# Patient Record
Sex: FEMALE | Race: WHITE | Employment: OTHER | URBAN - METROPOLITAN AREA
[De-identification: names, ages, dates, MRNs, and addresses within clinical notes are randomized per-mention and may not be internally consistent; named-entity substitution may affect disease eponyms.]

---

## 2017-11-25 ENCOUNTER — HOSPITAL ENCOUNTER (OUTPATIENT)
Dept: ULTRASOUND IMAGING | Facility: HOSPITAL | Age: 67
Discharge: HOME | End: 2017-11-25
Attending: INTERNAL MEDICINE
Payer: MEDICAID

## 2017-11-25 DIAGNOSIS — R16.0 HEPATOMEGALY: ICD-10-CM

## 2017-11-25 DIAGNOSIS — R22.1 MASS OF LEFT SIDE OF NECK: ICD-10-CM

## 2017-11-25 PROCEDURE — 76700 US EXAM ABDOM COMPLETE: CPT | Performed by: INTERNAL MEDICINE

## 2017-11-25 PROCEDURE — 76536 US EXAM OF HEAD AND NECK: CPT | Performed by: INTERNAL MEDICINE

## 2018-02-12 ENCOUNTER — OFFICE VISIT (OUTPATIENT)
Dept: PULMONOLOGY | Facility: CLINIC | Age: 68
End: 2018-02-12

## 2018-02-12 VITALS
HEIGHT: 62 IN | DIASTOLIC BLOOD PRESSURE: 72 MMHG | HEART RATE: 116 BPM | RESPIRATION RATE: 20 BRPM | SYSTOLIC BLOOD PRESSURE: 124 MMHG | TEMPERATURE: 97 F | OXYGEN SATURATION: 90 % | WEIGHT: 95 LBS | BODY MASS INDEX: 17.48 KG/M2

## 2018-02-12 DIAGNOSIS — I25.9 CHEST PAIN DUE TO MYOCARDIAL ISCHEMIA, UNSPECIFIED ISCHEMIC CHEST PAIN TYPE: ICD-10-CM

## 2018-02-12 DIAGNOSIS — Z87.891 PERSONAL HISTORY OF TOBACCO USE, PRESENTING HAZARDS TO HEALTH: ICD-10-CM

## 2018-02-12 DIAGNOSIS — J42 CHRONIC BRONCHITIS, UNSPECIFIED CHRONIC BRONCHITIS TYPE (HCC): Primary | ICD-10-CM

## 2018-02-12 PROCEDURE — 94761 N-INVAS EAR/PLS OXIMETRY MLT: CPT | Performed by: INTERNAL MEDICINE

## 2018-02-12 PROCEDURE — 99212 OFFICE O/P EST SF 10 MIN: CPT | Performed by: INTERNAL MEDICINE

## 2018-02-12 PROCEDURE — 94010 BREATHING CAPACITY TEST: CPT | Performed by: INTERNAL MEDICINE

## 2018-02-12 PROCEDURE — 99244 OFF/OP CNSLTJ NEW/EST MOD 40: CPT | Performed by: INTERNAL MEDICINE

## 2018-02-12 NOTE — PATIENT INSTRUCTIONS
[facility] [phone] for oxygen order    Pulmonary Rehab [phone]    [facility] [phone] for prior authorization for LexiScan and CT Scan

## 2018-02-12 NOTE — PROGRESS NOTES
Dear Romario Anthony:           As you know, Geraldo Peterson is a 70-year-old female who I am now evaluating for COPD.     HISTORY OF PRESENT ILLNESS: The patient has a history of tobacco use having smoked approximately 1 pack per day through most of her adult life and she i Abdomen nontender, without hepatosplenomegaly and no mass appreciable. Extremities without clubbing cyanosis nor edema. Neurologic grossly intact with symmetric tone and strength and reflex. LABORATORY: None    ASSESSMENT AND PLAN:  PROBLEM 1.   Dyspnea

## 2018-03-08 ENCOUNTER — TELEPHONE (OUTPATIENT)
Dept: PULMONOLOGY | Facility: CLINIC | Age: 68
End: 2018-03-08

## 2018-03-08 DIAGNOSIS — J44.9 CHRONIC OBSTRUCTIVE PULMONARY DISEASE, UNSPECIFIED COPD TYPE (HCC): Primary | ICD-10-CM

## 2018-03-08 NOTE — TELEPHONE ENCOUNTER
Merritt Kiran requesting order for POC be sent to [facility] informed will send order today via fax.  Merritt Kiran instructed to have pt f/u tomorrow with Raphael Muñiz re: status of order as pt only has until 3/12 until she would need a new f2f before getting a new order due to 3

## 2018-04-16 ENCOUNTER — OFFICE VISIT (OUTPATIENT)
Dept: NEUROLOGY | Facility: CLINIC | Age: 68
End: 2018-04-16

## 2018-04-16 ENCOUNTER — HOSPITAL ENCOUNTER (OUTPATIENT)
Dept: GENERAL RADIOLOGY | Facility: HOSPITAL | Age: 68
Discharge: HOME | End: 2018-04-16
Attending: PHYSICAL MEDICINE & REHABILITATION
Payer: MEDICAID

## 2018-04-16 ENCOUNTER — TELEPHONE (OUTPATIENT)
Dept: NEUROLOGY | Facility: CLINIC | Age: 68
End: 2018-04-16

## 2018-04-16 VITALS
RESPIRATION RATE: 13 BRPM | HEART RATE: 85 BPM | HEIGHT: 62 IN | WEIGHT: 95 LBS | DIASTOLIC BLOOD PRESSURE: 66 MMHG | BODY MASS INDEX: 17.48 KG/M2 | SYSTOLIC BLOOD PRESSURE: 114 MMHG

## 2018-04-16 DIAGNOSIS — G89.29 CHRONIC BILATERAL LOW BACK PAIN WITHOUT SCIATICA: ICD-10-CM

## 2018-04-16 DIAGNOSIS — M54.12 CERVICAL RADICULOPATHY: ICD-10-CM

## 2018-04-16 DIAGNOSIS — M54.50 CHRONIC BILATERAL LOW BACK PAIN WITHOUT SCIATICA: ICD-10-CM

## 2018-04-16 DIAGNOSIS — M54.2 NECK PAIN: ICD-10-CM

## 2018-04-16 DIAGNOSIS — M54.16 LUMBAR RADICULOPATHY: ICD-10-CM

## 2018-04-16 DIAGNOSIS — M54.2 NECK PAIN: Primary | ICD-10-CM

## 2018-04-16 DIAGNOSIS — G56.01 RIGHT CARPAL TUNNEL SYNDROME: ICD-10-CM

## 2018-04-16 PROCEDURE — 99204 OFFICE O/P NEW MOD 45 MIN: CPT | Performed by: PHYSICAL MEDICINE & REHABILITATION

## 2018-04-16 PROCEDURE — 72050 X-RAY EXAM NECK SPINE 4/5VWS: CPT | Performed by: PHYSICAL MEDICINE & REHABILITATION

## 2018-04-16 PROCEDURE — 72120 X-RAY BEND ONLY L-S SPINE: CPT | Performed by: PHYSICAL MEDICINE & REHABILITATION

## 2018-04-16 NOTE — TELEPHONE ENCOUNTER
[facility] Online for authorization of approval for MRI L-spine wo. Approval was given with Authorization # [id number] effective 04/16/18 to 05/31/18. [facility]le [facility] Online for authorization of approval for MRI C-spine wo.  Case # [id number] p

## 2018-04-16 NOTE — TELEPHONE ENCOUNTER
Richelle for [facility] BS Online for authorization of approval for MRI C-spine wo. Case # [id number] pending approval. Will call Yunior Calderon MRI C-spine is approved

## 2018-04-16 NOTE — PROGRESS NOTES
Back Pain H & P    Chief Complaint:  Patient presents with:  Back Pain: new right handed patient here with chronic entire back pain that worsens with lifting, bending. denies numbness and tingling.  denies recent imaging, physical therapy or spinal injectio History     Social History  Social History   Marital status: Life Partner  Spouse name: N/A    Years of education: N/A  Number of children: N/A     Occupational History  None on file     Social History Main Topics   Smoking status: Current Every Day Smoker or discharge bilaterally. Skin:  There are no rashes or lesions.      Lymph nodes:  Non palpable submandibular, supraclavicular, and posterior cervical    Vitals:   04/16/18  1539   BP: 114/66   Pulse: 85   Resp: 13     Cervical Spine:    Posture: modera 3/5 with significant atrophy  FDI Left: 4/5  ADM Left: 4/5  EIP Left: 4/5  Triceps Left: 3+/5  Shoulder external rotators Right: 4-/5  Shoulder external rotators Left: 4-/5  Serratus anterior Right: 3+/5  Serratus anterior Left: 3+/5   Reflexes: 2+ In the C8 radiculopathies    3. Right carpal tunnel syndrome    4. Chronic bilateral low back pain without sciatica    5. left > right L5-S1 radiculopathies      Plan  She will try Aleve 2 times a day and will see if this will help her pain better.     She will ge

## 2018-04-16 NOTE — PATIENT INSTRUCTIONS
As of October 6th 2014, the Drug Enforcement Agency [facility]) is reclassifying all hydrocodone combination medications from Schedule III to Schedule II. This includes medications such as Norco, Vicodin, Lortab, Zohydro, and Vicoprofen.     What this means for y chart.      Plan  She will try Aleve 2 times a day and will see if this will help her pain better. She will get cervical and lumbar spine x-rays and MRI scans.     She will call me once she has had the imaging studies and I will review them and my office

## 2018-04-18 NOTE — TELEPHONE ENCOUNTER
Richelle for [facility] BS Online to check on status for authorization of approval for MRI C-spine wo. Approval was given with Authorization # [id number] effective 04/18/18 to 06/01/18. Will call Pt. to inform. Pt. Informed of approvals.  MRIs are scheduled on 05/0

## 2018-05-07 ENCOUNTER — HOSPITAL ENCOUNTER (OUTPATIENT)
Dept: MRI IMAGING | Facility: HOSPITAL | Age: 68
Discharge: HOME | End: 2018-05-07
Attending: PHYSICAL MEDICINE & REHABILITATION
Payer: MEDICAID

## 2018-05-07 DIAGNOSIS — M54.50 CHRONIC BILATERAL LOW BACK PAIN WITHOUT SCIATICA: ICD-10-CM

## 2018-05-07 DIAGNOSIS — M54.12 CERVICAL RADICULOPATHY: ICD-10-CM

## 2018-05-07 DIAGNOSIS — M54.16 LUMBAR RADICULOPATHY: ICD-10-CM

## 2018-05-07 DIAGNOSIS — G89.29 CHRONIC BILATERAL LOW BACK PAIN WITHOUT SCIATICA: ICD-10-CM

## 2018-05-07 DIAGNOSIS — M54.2 NECK PAIN: ICD-10-CM

## 2018-05-07 PROCEDURE — 72148 MRI LUMBAR SPINE W/O DYE: CPT | Performed by: PHYSICAL MEDICINE & REHABILITATION

## 2018-05-07 PROCEDURE — 72141 MRI NECK SPINE W/O DYE: CPT | Performed by: PHYSICAL MEDICINE & REHABILITATION

## 2018-05-14 ENCOUNTER — OFFICE VISIT (OUTPATIENT)
Dept: PULMONOLOGY | Facility: CLINIC | Age: 68
End: 2018-05-14

## 2018-05-14 VITALS
BODY MASS INDEX: 17.74 KG/M2 | DIASTOLIC BLOOD PRESSURE: 77 MMHG | HEIGHT: 62 IN | SYSTOLIC BLOOD PRESSURE: 111 MMHG | OXYGEN SATURATION: 91 % | RESPIRATION RATE: 19 BRPM | HEART RATE: 75 BPM | WEIGHT: 96.38 LBS

## 2018-05-14 DIAGNOSIS — J42 CHRONIC BRONCHITIS, UNSPECIFIED CHRONIC BRONCHITIS TYPE (HCC): Primary | ICD-10-CM

## 2018-05-14 PROCEDURE — 99213 OFFICE O/P EST LOW 20 MIN: CPT | Performed by: INTERNAL MEDICINE

## 2018-05-14 PROCEDURE — 99212 OFFICE O/P EST SF 10 MIN: CPT | Performed by: INTERNAL MEDICINE

## 2018-05-14 RX ORDER — ALBUTEROL SULFATE 2.5 MG/3ML
2.5 SOLUTION RESPIRATORY (INHALATION) EVERY 6 HOURS PRN
Qty: 120 VIAL | Refills: 4 | Status: SHIPPED | OUTPATIENT
Start: 2018-05-14 | End: 2019-12-10

## 2018-05-14 NOTE — PROGRESS NOTES
The patient is a 42-year-old female who comes in now for follow-up. She has COPD and she continues to smoke. She was advised to get a stress test which she did not follow-up on and she defers for now.   I also encouraged her to matriculate pulmonary rehab

## 2018-06-08 ENCOUNTER — TELEPHONE (OUTPATIENT)
Dept: PULMONOLOGY | Facility: CLINIC | Age: 68
End: 2018-06-08

## 2018-06-08 NOTE — TELEPHONE ENCOUNTER
Ya/CAITY called to find out if pt has had oxygen saturation test done within last year. Please call.

## 2018-06-28 ENCOUNTER — TELEPHONE (OUTPATIENT)
Dept: NEUROLOGY | Facility: CLINIC | Age: 68
End: 2018-06-28

## 2018-06-28 PROBLEM — M48.061 LUMBAR FORAMINAL STENOSIS: Status: ACTIVE | Noted: 2018-06-28

## 2018-06-28 PROBLEM — M51.9 LUMBAR DISC DISEASE: Status: ACTIVE | Noted: 2018-06-28

## 2018-06-28 PROBLEM — M50.90 CERVICAL DISC DISEASE: Status: ACTIVE | Noted: 2018-06-28

## 2018-06-28 PROBLEM — M48.02 CERVICAL STENOSIS OF SPINE: Status: ACTIVE | Noted: 2018-06-28

## 2018-06-28 PROBLEM — M47.892 OTHER SPONDYLOSIS, CERVICAL REGION: Status: ACTIVE | Noted: 2018-06-28

## 2018-06-28 NOTE — TELEPHONE ENCOUNTER
----- Message from Kathy Oconnell MD sent at 6/28/2018  4:48 PM CDT -----  L5-S1 > L4-5 bulging discs. I can review with her at her follow up, her options.   We had spoken about her starting PT after she had called me to let me know that she had had the im

## 2018-07-30 ENCOUNTER — OFFICE VISIT (OUTPATIENT)
Dept: NEUROLOGY | Facility: CLINIC | Age: 68
End: 2018-07-30
Payer: MEDICAID

## 2018-07-30 VITALS — DIASTOLIC BLOOD PRESSURE: 54 MMHG | HEART RATE: 98 BPM | SYSTOLIC BLOOD PRESSURE: 90 MMHG | RESPIRATION RATE: 24 BRPM

## 2018-07-30 DIAGNOSIS — M48.061 LUMBAR FORAMINAL STENOSIS: ICD-10-CM

## 2018-07-30 DIAGNOSIS — M54.50 CHRONIC BILATERAL LOW BACK PAIN WITHOUT SCIATICA: ICD-10-CM

## 2018-07-30 DIAGNOSIS — M54.12 CERVICAL RADICULOPATHY: Primary | ICD-10-CM

## 2018-07-30 DIAGNOSIS — M54.16 LUMBAR RADICULOPATHY: ICD-10-CM

## 2018-07-30 DIAGNOSIS — M48.02 CERVICAL STENOSIS OF SPINE: ICD-10-CM

## 2018-07-30 DIAGNOSIS — M51.9 LUMBAR DISC DISEASE: ICD-10-CM

## 2018-07-30 DIAGNOSIS — G89.29 CHRONIC BILATERAL LOW BACK PAIN WITHOUT SCIATICA: ICD-10-CM

## 2018-07-30 DIAGNOSIS — M25.511 ACUTE PAIN OF RIGHT SHOULDER: ICD-10-CM

## 2018-07-30 DIAGNOSIS — M50.90 CERVICAL DISC DISEASE: ICD-10-CM

## 2018-07-30 DIAGNOSIS — M54.2 NECK PAIN: ICD-10-CM

## 2018-07-30 PROCEDURE — 99214 OFFICE O/P EST MOD 30 MIN: CPT | Performed by: PHYSICAL MEDICINE & REHABILITATION

## 2018-07-30 RX ORDER — DULOXETIN HYDROCHLORIDE 30 MG/1
30 CAPSULE, DELAYED RELEASE ORAL DAILY
Qty: 30 CAPSULE | Refills: 2 | Status: SHIPPED | OUTPATIENT
Start: 2018-07-30 | End: 2018-11-01

## 2018-07-30 NOTE — PROGRESS NOTES
Low Back Pain H & P    Chief Complaint: Patient presents with:  Neck Pain: pt here for follow up after Lumbar Spine and Cervical Spine MRI 5/2018 with c/o continued entire spine pain (neck pain and back pain) that is constant with a pain score of 10/10.  pt - Lumbar  · The pain is located in the bilateral low back. · The pain does not radiate. .  · There is no numbness. · There is no tingling in the legs. · There is weakness in both legs. She noticed this a couple of days ago.    · The low back pain is a and reactive to light. No redness or discharge bilaterally. Skin:  There are no rashes or lesions. Lymph Nodes: The patient has no palpable submandibular, supraclavicular, and cervical lymph nodes. .    Vitals:   07/30/18  1601   BP: 90/54   Pulse: 9 T12, L1, L2, L3, L4, L5 and S1     Vascular lower extremity:   Dorsalis pedis pulse-RIGHT 2+   Dorsalis pedis pulse-LEFT 2+   Tibialis posterior pulse-RIGHT 2+   Tibialis posterior pulse-LEFT 2+     Neurological Lower Extremity:    Light Touch Sensation: I

## 2018-07-30 NOTE — PATIENT INSTRUCTIONS
As of October 6th 2014, the Drug Enforcement Agency [facility]) is reclassifying all hydrocodone combination medications from Schedule III to Schedule II. This includes medications such as Norco, Vicodin, Lortab, Zohydro, and Vicoprofen.     What this means for y will try Cymbalta for the pain. She does not want to do any PT at this time due to her 's illness. The patient does not need any injections at this time. She will call me in one month to let me know how she is doing with the Cymbalta.

## 2018-08-27 ENCOUNTER — TELEPHONE (OUTPATIENT)
Dept: NEUROLOGY | Facility: CLINIC | Age: 68
End: 2018-08-27

## 2018-08-27 NOTE — TELEPHONE ENCOUNTER
Pt calling with sister,Yenny Ojeda, on the line. Pt's  passed away this week and Pt is now eligible for social security benefits but unable to get to [facility] office due to her condition. Pt requesting a letter from Dr. Mady Mulligan stating her condition.     P

## 2018-08-27 NOTE — TELEPHONE ENCOUNTER
Spoke with Sandy Eisenberg and we would be able to write a note with the patient's dx but not a note stating she is unable to go to [employer] based on her LOV. Called sister Margo Morin, offered condolences from the office, and informed her of the above.

## 2018-08-28 ENCOUNTER — TELEPHONE (OUTPATIENT)
Dept: NEUROLOGY | Facility: CLINIC | Age: 68
End: 2018-08-28

## 2018-08-28 NOTE — TELEPHONE ENCOUNTER
Patient has 2 refills on Duloxetine 30mg   Duloxetine 30mg daily #30 r-2 was sent to [facility] in [facility]

## 2018-11-01 ENCOUNTER — OFFICE VISIT (OUTPATIENT)
Dept: NEUROLOGY | Facility: CLINIC | Age: 68
End: 2018-11-01
Payer: MEDICAID

## 2018-11-01 ENCOUNTER — TELEPHONE (OUTPATIENT)
Dept: NEUROLOGY | Facility: CLINIC | Age: 68
End: 2018-11-01

## 2018-11-01 VITALS — HEART RATE: 70 BPM | DIASTOLIC BLOOD PRESSURE: 62 MMHG | RESPIRATION RATE: 20 BRPM | SYSTOLIC BLOOD PRESSURE: 118 MMHG

## 2018-11-01 DIAGNOSIS — M48.061 LUMBAR FORAMINAL STENOSIS: ICD-10-CM

## 2018-11-01 DIAGNOSIS — R26.9 NEUROLOGIC GAIT DISORDER: ICD-10-CM

## 2018-11-01 DIAGNOSIS — M50.90 CERVICAL DISC DISEASE: ICD-10-CM

## 2018-11-01 DIAGNOSIS — J42 CHRONIC BRONCHITIS, UNSPECIFIED CHRONIC BRONCHITIS TYPE (HCC): ICD-10-CM

## 2018-11-01 DIAGNOSIS — G89.29 CHRONIC BILATERAL LOW BACK PAIN WITHOUT SCIATICA: ICD-10-CM

## 2018-11-01 DIAGNOSIS — M54.16 LUMBAR RADICULOPATHY: Primary | ICD-10-CM

## 2018-11-01 DIAGNOSIS — M54.50 CHRONIC BILATERAL LOW BACK PAIN WITHOUT SCIATICA: ICD-10-CM

## 2018-11-01 DIAGNOSIS — M51.9 LUMBAR DISC DISEASE: ICD-10-CM

## 2018-11-01 DIAGNOSIS — M54.2 NECK PAIN: ICD-10-CM

## 2018-11-01 DIAGNOSIS — M48.02 CERVICAL STENOSIS OF SPINE: ICD-10-CM

## 2018-11-01 PROCEDURE — 99214 OFFICE O/P EST MOD 30 MIN: CPT | Performed by: PHYSICAL MEDICINE & REHABILITATION

## 2018-11-01 RX ORDER — DULOXETIN HYDROCHLORIDE 30 MG/1
30 CAPSULE, DELAYED RELEASE ORAL 2 TIMES DAILY
Qty: 60 CAPSULE | Refills: 0 | Status: SHIPPED | OUTPATIENT
Start: 2018-11-01 | End: 2018-11-13

## 2018-11-01 NOTE — PROGRESS NOTES
Low Back Pain H & P    Chief Complaint: Patient presents with:  Low Back Pain: pt here for follow up with c/o severe low back pain that travels up the back to the neck. feels the pain worsened since last visit. pain improves with sitting still.  discontinue Transportation needs - medical: Not on file      Transportation needs - non-medical: Not on file    Occupational History      Not on file    Tobacco Use      Smoking status: Current Every Day Smoker        Packs/day: 1.00        Years: 30.00        Pack ye Extremity:    Light Touch: Intact in Bilateral upper extremities. Pin Prick: Not tested. UE Muscle Strength:  All Upper Extremity strength measurements 5/5 except:  ABP Right: 3+/5  ABP Left: 3+/5  FDI Right: 3+/5  FDI Left: 3+/5  ADM Right: 3+/5  ADM L agrees with the stated plan. Cristopher Caba MD  11/1/2018

## 2018-11-01 NOTE — PATIENT INSTRUCTIONS
As of October 6th 2014, the Drug Enforcement Agency [facility]) is reclassifying all hydrocodone combination medications from Schedule III to Schedule II. This includes medications such as Norco, Vicodin, Lortab, Zohydro, and Vicoprofen.     What this means for y will perform bilateral L5 TFESI(s) under MAC. She will get home health PT and OT. The patient will follow up in 3 months, but the patient will call me 2 weeks after having the injection to let me know how the injection worked.     She will try the Cym

## 2018-11-01 NOTE — TELEPHONE ENCOUNTER
Patient has been scheduled for a bilateral L5 TFESI on 11/16/18 at the [address] [place] [address]. Medications and allergies reviewed. Patient informed to hold aspirins, nsaids, blood thinners, vitamins and fish oils 3-7 days prior to procedure.  Patient informed we will need roxana

## 2018-11-01 NOTE — TELEPHONE ENCOUNTER
Bilateral L5 TFESIs cpt codes [id number], 01139  Pt. is eligible for services until 11/30/18. On 12/01/18 Pt. will be eligible with [employer]. We are out of network with this plan. Will inform Nursing.

## 2018-11-16 ENCOUNTER — HOSPITAL ENCOUNTER (OUTPATIENT)
Facility: HOSPITAL | Age: 68
Setting detail: HOSPITAL OUTPATIENT SURGERY
Discharge: HOME | End: 2018-11-16
Attending: PHYSICAL MEDICINE & REHABILITATION | Admitting: PHYSICAL MEDICINE & REHABILITATION
Payer: MEDICAID

## 2018-11-16 ENCOUNTER — ANESTHESIA (OUTPATIENT)
Dept: SURGERY | Facility: HOSPITAL | Age: 68
End: 2018-11-16
Payer: MEDICAID

## 2018-11-16 ENCOUNTER — ANESTHESIA EVENT (OUTPATIENT)
Dept: SURGERY | Facility: HOSPITAL | Age: 68
End: 2018-11-16
Payer: MEDICAID

## 2018-11-16 ENCOUNTER — APPOINTMENT (OUTPATIENT)
Dept: GENERAL RADIOLOGY | Facility: HOSPITAL | Age: 68
End: 2018-11-16
Attending: PHYSICAL MEDICINE & REHABILITATION
Payer: MEDICAID

## 2018-11-16 VITALS
TEMPERATURE: 98 F | WEIGHT: 90 LBS | BODY MASS INDEX: 16.56 KG/M2 | OXYGEN SATURATION: 100 % | HEART RATE: 67 BPM | SYSTOLIC BLOOD PRESSURE: 141 MMHG | HEIGHT: 62 IN | RESPIRATION RATE: 16 BRPM | DIASTOLIC BLOOD PRESSURE: 55 MMHG

## 2018-11-16 PROCEDURE — 3E0R3BZ INTRODUCTION OF ANESTHETIC AGENT INTO SPINAL CANAL, PERCUTANEOUS APPROACH: ICD-10-PCS | Performed by: PHYSICAL MEDICINE & REHABILITATION

## 2018-11-16 PROCEDURE — 3E0R33Z INTRODUCTION OF ANTI-INFLAMMATORY INTO SPINAL CANAL, PERCUTANEOUS APPROACH: ICD-10-PCS | Performed by: PHYSICAL MEDICINE & REHABILITATION

## 2018-11-16 PROCEDURE — 64483 NJX AA&/STRD TFRM EPI L/S 1: CPT | Performed by: PHYSICAL MEDICINE & REHABILITATION

## 2018-11-16 RX ORDER — LIDOCAINE HYDROCHLORIDE 10 MG/ML
INJECTION, SOLUTION EPIDURAL; INFILTRATION; INTRACAUDAL; PERINEURAL AS NEEDED
Status: DISCONTINUED | OUTPATIENT
Start: 2018-11-16 | End: 2018-11-16 | Stop reason: HOSPADM

## 2018-11-16 RX ORDER — HYDROCODONE BITARTRATE AND ACETAMINOPHEN 5; 325 MG/1; MG/1
1 TABLET ORAL AS NEEDED
Status: DISCONTINUED | OUTPATIENT
Start: 2018-11-16 | End: 2018-11-16

## 2018-11-16 RX ORDER — HALOPERIDOL 5 MG/ML
0.25 INJECTION INTRAMUSCULAR ONCE AS NEEDED
Status: DISCONTINUED | OUTPATIENT
Start: 2018-11-16 | End: 2018-11-16

## 2018-11-16 RX ORDER — SODIUM CHLORIDE, SODIUM LACTATE, POTASSIUM CHLORIDE, CALCIUM CHLORIDE 600; 310; 30; 20 MG/100ML; MG/100ML; MG/100ML; MG/100ML
INJECTION, SOLUTION INTRAVENOUS CONTINUOUS
Status: DISCONTINUED | OUTPATIENT
Start: 2018-11-16 | End: 2018-11-16

## 2018-11-16 RX ORDER — FAMOTIDINE 20 MG/1
20 TABLET ORAL ONCE
Status: DISCONTINUED | OUTPATIENT
Start: 2018-11-16 | End: 2018-11-16 | Stop reason: HOSPADM

## 2018-11-16 RX ORDER — HYDROCODONE BITARTRATE AND ACETAMINOPHEN 5; 325 MG/1; MG/1
2 TABLET ORAL AS NEEDED
Status: DISCONTINUED | OUTPATIENT
Start: 2018-11-16 | End: 2018-11-16

## 2018-11-16 RX ORDER — METOPROLOL TARTRATE 5 MG/5ML
2.5 INJECTION INTRAVENOUS ONCE
Status: DISCONTINUED | OUTPATIENT
Start: 2018-11-16 | End: 2018-11-16

## 2018-11-16 RX ORDER — LIDOCAINE HYDROCHLORIDE 10 MG/ML
INJECTION, SOLUTION EPIDURAL; INFILTRATION; INTRACAUDAL; PERINEURAL AS NEEDED
Status: DISCONTINUED | OUTPATIENT
Start: 2018-11-16 | End: 2018-11-16 | Stop reason: SURG

## 2018-11-16 RX ORDER — MORPHINE SULFATE 10 MG/ML
6 INJECTION, SOLUTION INTRAMUSCULAR; INTRAVENOUS EVERY 10 MIN PRN
Status: DISCONTINUED | OUTPATIENT
Start: 2018-11-16 | End: 2018-11-16

## 2018-11-16 RX ORDER — ACETAMINOPHEN 500 MG
1000 TABLET ORAL ONCE
Status: DISCONTINUED | OUTPATIENT
Start: 2018-11-16 | End: 2018-11-16 | Stop reason: HOSPADM

## 2018-11-16 RX ORDER — NALOXONE HYDROCHLORIDE 0.4 MG/ML
80 INJECTION, SOLUTION INTRAMUSCULAR; INTRAVENOUS; SUBCUTANEOUS AS NEEDED
Status: DISCONTINUED | OUTPATIENT
Start: 2018-11-16 | End: 2018-11-16

## 2018-11-16 RX ORDER — ONDANSETRON 2 MG/ML
4 INJECTION INTRAMUSCULAR; INTRAVENOUS ONCE AS NEEDED
Status: DISCONTINUED | OUTPATIENT
Start: 2018-11-16 | End: 2018-11-16

## 2018-11-16 RX ORDER — MORPHINE SULFATE 4 MG/ML
2 INJECTION, SOLUTION INTRAMUSCULAR; INTRAVENOUS EVERY 10 MIN PRN
Status: DISCONTINUED | OUTPATIENT
Start: 2018-11-16 | End: 2018-11-16

## 2018-11-16 RX ORDER — TRIAMCINOLONE ACETONIDE 40 MG/ML
INJECTION, SUSPENSION INTRA-ARTICULAR; INTRAMUSCULAR AS NEEDED
Status: DISCONTINUED | OUTPATIENT
Start: 2018-11-16 | End: 2018-11-16 | Stop reason: HOSPADM

## 2018-11-16 RX ORDER — METOCLOPRAMIDE 10 MG/1
10 TABLET ORAL ONCE
Status: DISCONTINUED | OUTPATIENT
Start: 2018-11-16 | End: 2018-11-16 | Stop reason: HOSPADM

## 2018-11-16 RX ORDER — MORPHINE SULFATE 4 MG/ML
4 INJECTION, SOLUTION INTRAMUSCULAR; INTRAVENOUS EVERY 10 MIN PRN
Status: DISCONTINUED | OUTPATIENT
Start: 2018-11-16 | End: 2018-11-16

## 2018-11-16 RX ADMIN — LIDOCAINE HYDROCHLORIDE 50 MG: 10 INJECTION, SOLUTION EPIDURAL; INFILTRATION; INTRACAUDAL; PERINEURAL at 14:40:00

## 2018-11-16 NOTE — DISCHARGE SUMMARY
[facility]    Discharge Summary    Traci Cooper Patient Status:  Hospital Outpatient Surgery    1950 MRN [id number]   Location [address] [facility] Attending Riana Wang MD   Hosp Day # 0 PCP Õie 16 TAKE 1 TABLET BY MOUTH TWICE DAILY AS NEEDED FOR ANXIETY   Quantity:  60 tablet  Refills:  0     ASPIRIN LOW DOSE 81 MG Tbec  Generic drug:  aspirin      TAKE 1 TABLET BY MOUTH EVERY DAY   Quantity:  90 tablet  Refills:  0     Budesonide-Formoterol Fumarat

## 2018-11-16 NOTE — ANESTHESIA PREPROCEDURE EVALUATION
Anesthesia PreOp Note    HPI:     Honorio Roberts is a 76year old female who presents for preoperative consultation requested by: Rosalinda Cordoba MD    Date of Surgery: 11/16/2018    Procedure(s):  LUMBAR INTERLAMINAR EPIDURAL INJECTION  Indication: ri R hand tedon release         Medications Prior to Admission:  ASPIRIN LOW DOSE 81 MG Oral Tab EC TAKE 1 TABLET BY MOUTH EVERY DAY Disp: 90 tablet Rfl: 0 11/09/18   METOPROLOL TARTRATE 25 MG Oral Tab TAKE 1 TABLET(25 MG) BY MOUTH TWICE DAILY Disp: 180 ta file.      Penicillins             OTHER (SEE COMMENTS)    Comment:Was told as a child she's allergic, unsure what             type of reaction she has    Family History   Problem Relation Age of Onset   • Cancer Father 79        colon   • Heart Disorder M weight is 40.8 kg (90 lb). Her oral temperature is 97.4 °F (36.3 °C). Her blood pressure is 124/56 and her pulse is 71. Her respiration is 16 and oxygen saturation is 100%.     11/13/18  1120 11/16/18  1220   BP:  124/56   BP Location:  Right arm   Pulse:

## 2018-11-16 NOTE — OPERATIVE REPORT
[facility] AND [facility] Surgery    BILATERAL LUMBAR TRANSFORAMINAL   NAME:  Carlo Perez    MR #:    [id number] :  1950     PHYSICIAN:  Jaimie Diamond        Operative Report    DATE OF PROCEDURE: 2018   PREOPERATIVE DIAGNOSES: Problem   L4-5 intervertebral foramen. At this point in time, the patient's skin was anesthetized with 2 to 3 cc of 1% PF lidocaine without epinephrine. Then, a 3.5 inch, 22-gauge spinal needle was inserted and directed towards the left L5-S1 intervertebral foramen.   Molly Jonas

## 2018-11-16 NOTE — ANESTHESIA POSTPROCEDURE EVALUATION
Patient: Jeremias Gudino    Procedure Summary     Date:  11/16/18 Room / Location:  [address] [place] [address] MAIN OR 02 / [address] [place] [address] MAIN OR    Anesthesia Start:  5714 Anesthesia Stop:      Procedure:  LUMBAR INTERLAMINAR EPIDURAL INJECTION (Bilateral Back) Diagnosis:  (right L5-S

## 2018-12-05 ENCOUNTER — TELEPHONE (OUTPATIENT)
Dept: PULMONOLOGY | Facility: CLINIC | Age: 68
End: 2018-12-05

## 2018-12-05 RX ORDER — PREDNISONE 20 MG/1
TABLET ORAL
Qty: 10 TABLET | Refills: 0 | Status: SHIPPED | OUTPATIENT
Start: 2018-12-05 | End: 2018-12-15 | Stop reason: ALTCHOICE

## 2018-12-05 NOTE — TELEPHONE ENCOUNTER
Informed pt of Dr. Zo Kent orders. Sched pt on 12/6 11 am [facility]. Appt info given. Explained rx sent to pharmacy. She voiced understanding.

## 2018-12-05 NOTE — TELEPHONE ENCOUNTER
Yes pred and add on. I can have add on preload this Thurs, but not next Thursday as I chair a meeting then.

## 2018-12-05 NOTE — TELEPHONE ENCOUNTER
Pt states her breathing is worse and she would like to be seen sooner than first available. Please call.

## 2018-12-05 NOTE — TELEPHONE ENCOUNTER
Pt c/o HOLGUIN onset quite awhile which is progressively worsening. She denies dyspnea @ rest, wheezing, cough, or any other symptoms. Pt stts she is using Symbicort twice daily & albuterol sulfate HFA prn.  She stts she just used alb sulfate nebs which she has

## 2018-12-06 ENCOUNTER — OFFICE VISIT (OUTPATIENT)
Dept: PULMONOLOGY | Facility: CLINIC | Age: 68
End: 2018-12-06
Payer: MEDICAID

## 2018-12-06 ENCOUNTER — TELEPHONE (OUTPATIENT)
Dept: PULMONOLOGY | Facility: CLINIC | Age: 68
End: 2018-12-06

## 2018-12-06 VITALS
OXYGEN SATURATION: 100 % | WEIGHT: 96 LBS | HEIGHT: 62 IN | RESPIRATION RATE: 18 BRPM | DIASTOLIC BLOOD PRESSURE: 50 MMHG | BODY MASS INDEX: 17.66 KG/M2 | HEART RATE: 74 BPM | SYSTOLIC BLOOD PRESSURE: 86 MMHG

## 2018-12-06 DIAGNOSIS — J42 CHRONIC BRONCHITIS, UNSPECIFIED CHRONIC BRONCHITIS TYPE (HCC): Primary | ICD-10-CM

## 2018-12-06 PROCEDURE — 99213 OFFICE O/P EST LOW 20 MIN: CPT | Performed by: INTERNAL MEDICINE

## 2018-12-06 PROCEDURE — 99212 OFFICE O/P EST SF 10 MIN: CPT | Performed by: INTERNAL MEDICINE

## 2018-12-06 PROCEDURE — 94761 N-INVAS EAR/PLS OXIMETRY MLT: CPT | Performed by: INTERNAL MEDICINE

## 2018-12-06 NOTE — TELEPHONE ENCOUNTER
Pt seen in clinic today d/t sx per Haylee Salmeron (supervisor), but her insurance has recently changed to [employer] which is not in network. She stts she just rcvd insurance card in the mail. Per pt insurance is [employer], [employer] #[phone], ID #[id number], no Group #, & effective date 12/1/18. Explained to pt that RN trying to obtain prior auth for visit since her insurance is out of network. Pt voiced understanding. Per Reji Bergeron no prior Paulie Brooks is necessary for any codes billed [phone], [id number], & [phone]) if in network, we may want to join network, prior auth can only be done prior to visit, insurance must be billed first, we may request post service appeal once rejected which is likely since we are not in network, & there is nothing else we can do @ this point. She stts MR(s) & clinicals may be sent for review & determination would be made based on this info.

## 2018-12-06 NOTE — PROGRESS NOTES
The patient is a 51-year-old female who I know well from prior evaluation who comes in now for follow-up.   The patient has previously deferred low-dose CT screening program.  I also have encouraged her to matriculate pulmonary rehabilitation which she stil

## 2018-12-15 ENCOUNTER — APPOINTMENT (OUTPATIENT)
Dept: GENERAL RADIOLOGY | Facility: HOSPITAL | Age: 68
DRG: 329 | End: 2018-12-15
Attending: EMERGENCY MEDICINE
Payer: MEDICAID

## 2018-12-15 ENCOUNTER — HOSPITAL ENCOUNTER (INPATIENT)
Facility: HOSPITAL | Age: 68
LOS: 7 days | Discharge: HOME | DRG: 329 | End: 2018-12-22
Attending: EMERGENCY MEDICINE | Admitting: HOSPITALIST
Payer: MEDICAID

## 2018-12-15 DIAGNOSIS — K92.2 GASTROINTESTINAL HEMORRHAGE, UNSPECIFIED GASTROINTESTINAL HEMORRHAGE TYPE: ICD-10-CM

## 2018-12-15 DIAGNOSIS — D64.9 ANEMIA, UNSPECIFIED TYPE: Primary | ICD-10-CM

## 2018-12-15 PROCEDURE — 99254 IP/OBS CNSLTJ NEW/EST MOD 60: CPT | Performed by: INTERNAL MEDICINE

## 2018-12-15 PROCEDURE — 30233N1 TRANSFUSION OF NONAUTOLOGOUS RED BLOOD CELLS INTO PERIPHERAL VEIN, PERCUTANEOUS APPROACH: ICD-10-PCS | Performed by: HOSPITALIST

## 2018-12-15 PROCEDURE — 71046 X-RAY EXAM CHEST 2 VIEWS: CPT | Performed by: EMERGENCY MEDICINE

## 2018-12-15 PROCEDURE — 3E0F7GC INTRODUCTION OF OTHER THERAPEUTIC SUBSTANCE INTO RESPIRATORY TRACT, VIA NATURAL OR ARTIFICIAL OPENING: ICD-10-PCS | Performed by: HOSPITALIST

## 2018-12-15 PROCEDURE — 99223 1ST HOSP IP/OBS HIGH 75: CPT | Performed by: HOSPITALIST

## 2018-12-15 RX ORDER — FUROSEMIDE 10 MG/ML
20 INJECTION INTRAMUSCULAR; INTRAVENOUS
Status: DISCONTINUED | OUTPATIENT
Start: 2018-12-15 | End: 2018-12-18

## 2018-12-15 RX ORDER — IPRATROPIUM BROMIDE AND ALBUTEROL SULFATE 2.5; .5 MG/3ML; MG/3ML
3 SOLUTION RESPIRATORY (INHALATION) EVERY 6 HOURS PRN
Status: DISCONTINUED | OUTPATIENT
Start: 2018-12-15 | End: 2018-12-22

## 2018-12-15 RX ORDER — ALPRAZOLAM 1 MG/1
1 TABLET ORAL 2 TIMES DAILY PRN
Status: DISCONTINUED | OUTPATIENT
Start: 2018-12-15 | End: 2018-12-18

## 2018-12-15 RX ORDER — ACETAMINOPHEN 325 MG/1
650 TABLET ORAL EVERY 6 HOURS PRN
Status: DISCONTINUED | OUTPATIENT
Start: 2018-12-15 | End: 2018-12-22

## 2018-12-15 RX ORDER — FUROSEMIDE 10 MG/ML
40 INJECTION INTRAMUSCULAR; INTRAVENOUS ONCE
Status: COMPLETED | OUTPATIENT
Start: 2018-12-15 | End: 2018-12-15

## 2018-12-15 RX ORDER — ONDANSETRON 2 MG/ML
4 INJECTION INTRAMUSCULAR; INTRAVENOUS EVERY 6 HOURS PRN
Status: DISCONTINUED | OUTPATIENT
Start: 2018-12-15 | End: 2018-12-22

## 2018-12-15 RX ORDER — IPRATROPIUM BROMIDE AND ALBUTEROL SULFATE 2.5; .5 MG/3ML; MG/3ML
3 SOLUTION RESPIRATORY (INHALATION)
Status: DISCONTINUED | OUTPATIENT
Start: 2018-12-15 | End: 2018-12-22

## 2018-12-15 NOTE — CONSULTS
[facility] HOSP - [facility]    Report of Consultation    Jeremias Gudino Patient Status:  Emergency    1950 MRN [id number]   Location [address] Attending Ry Casiano MD   Hosp Day # 0 PCP Clovis Mejia     Date of assistive device. .      The patient does live in a home with stairs.             Current Medications:    Current Facility-Administered Medications:  furosemide (LASIX) injection 40 mg 40 mg Intravenous Once       (Not in a hospital admission)    Allergies interstitium. 2. No acute pulmonary consolidation     Dictated by (CST): Desmond Sicard, MD on 12/15/2018 at 12:47     Approved by (CST): Desmond Sicard, MD on 12/15/2018 at 12:49            Assessment   1. Severe anemia  2. COPD  3.   Dyspnea wit

## 2018-12-15 NOTE — CONSULTS
GI CONSULTATION:  Available medical records reviewed. Patient interviewed and examined. Please see orders and transcription. ( Dictated # [id number]).   Plan for blood transfusion to hemoglobin greater than 8 gradually over the next 1-2 days, preparation for c

## 2018-12-15 NOTE — CONSULTS
[facility]    [facility]   ATTENDING PHYSICIAN: Gretta De Anda MD   CONSULTING PHYSICIAN: Shantanu Barrera MD   PATIENT ACCOUNT#:   [de-identified]    LOCATION:  [facility] [address] [facility] 1  MEDICAL RECORD #:   [id number] lung cancer. REVIEW OF SYSTEMS:  Positive for fatigue, shortness of breath, wheezes. No chest pain. Positive for lower extremity edema as above. All other review of systems are negative on 12 points except as stated above.       PHYSICAL EXAMINATION: possibility. 2.   Chronic obstructive pulmonary disease as per pulmonary consult. 3.   Family history of colon cancer as above. RECOMMENDATIONS:    1. We will start a clear liquid diet tomorrow and prep for colonoscopy and EGD on Monday.   2.   Szymanski Patient

## 2018-12-15 NOTE — ED PROVIDER NOTES
Patient Seen in: [facility] AND [facility] Emergency Department    History   Patient presents with:  Dyspnea ANNE SOB (respiratory)    Stated Complaint: SOB for the past couple of days- states pale on Thursday.  Denies cp    HPI    27-year-old female with history and negative except as noted above.     Physical Exam     ED Triage Vitals   BP 12/15/18 1152 109/40   Pulse 12/15/18 1200 76   Resp 12/15/18 1152 24   Temp 12/15/18 1152 98.6 °F (37 °C)   Temp src 12/15/18 1152 Oral   SpO2 12/15/18 1200 100 %   O2 Device 1 PLATELET    Narrative: The following orders were created for panel order CBC WITH DIFFERENTIAL WITH PLATELET.   Procedure                               Abnormality         Status                     ---------                               ----------- consultants but not including time spent performing procedures.     Admission disposition: 12/15/2018  1:43 PM               Disposition and Plan     Clinical Impression:  Anemia, unspecified type  (primary encounter diagnosis)  Gastrointestinal hemorrhage,

## 2018-12-15 NOTE — H&P
[address] Patient Status:  Emergency    1950 MRN [id number]   Location [address] Attending Kelin Pearson MD   Hosp Day # 0 PCP Aayush Nguyen MD     Magdiel Medications   Prescriptions Last Dose Informant Patient Reported? Taking?    ALPRAZOLAM 1 MG Oral Tab   No No   Sig: TAKE 1 TABLET BY MOUTH TWICE DAILY AS NEEDED FOR ANXIETY   ASPIRIN LOW DOSE 81 MG Oral Tab EC   No No   Sig: TAKE 1 TABLET BY MOUTH EVERY DA oriented. Diffuse skin problem:  None. Eye:  Pupils are equal, round and reactive to light, extraocular movements are intact, Normal conjunctiva. HENT:  Normocephalic, oral mucosa is moist.  Head:  Normocephalic, atraumatic.   Neck:  Supple, non-tender, diastolic heart failure  Elevated BNP, x-ray with minimal findings suggesting fluid overload. Will get 2D echo, monitor I's and O's and daily weights.     Metabolic alkalosis  Likely secondary to compensation from chronic respiratory acidosis, will continu

## 2018-12-15 NOTE — CONSULTS
Carrol Mallory  4/26/1950    Reason for consult: CHF      HPI:   44-year-old female with underlying history of COPD, CHF who presents with chief complaint of increased dyspnea over the course last week. No prior CV h/o, followed by pulm services.   Ins Yes        Alcohol/week: 1.2 oz        Types: 2 Glasses of wine per week      Drug use: No      Sexual activity: Not on file    Other Topics      Concerns:         Service: Not Asked        Blood Transfusions: Not Asked        Caffeine Concern:  No Neuro: Alert and oriented x 3  HEENT: JVD 9-10  CV: RRR s1, s2, no s3, No m/r/g  Lungs: CTA b/l  Abd: Soft , NT / ND BS+   Ext: No C/C/ 1+ edema    Lab Results   Component Value Date    WBC 3.5 12/15/2018    HGB 5.3 12/15/2018    HCT 17.1 12/15/2018    P right mod foraminal stenosis     Acute pain of right shoulder     Neurologic gait disorder     Anemia        Impression  76year old      1. Severe Fe deficiency anemia  2. COPD  3. Dyspnea with exertion / elevated BNP  4. Prior tobacco abuse  5.   Hypo

## 2018-12-15 NOTE — ED NOTES
+hemocult on rectal exam done by Dr. Julita Liu. Marshall Zhao RN stand by during rectal exam.  Patient awake and alertx4. No acute distress

## 2018-12-16 PROCEDURE — 99232 SBSQ HOSP IP/OBS MODERATE 35: CPT | Performed by: INTERNAL MEDICINE

## 2018-12-16 PROCEDURE — 99233 SBSQ HOSP IP/OBS HIGH 50: CPT | Performed by: HOSPITALIST

## 2018-12-16 RX ORDER — SODIUM CHLORIDE 9 MG/ML
INJECTION, SOLUTION INTRAVENOUS
Status: COMPLETED
Start: 2018-12-16 | End: 2018-12-16

## 2018-12-16 RX ORDER — 0.9 % SODIUM CHLORIDE 0.9 %
VIAL (ML) INJECTION
Status: COMPLETED
Start: 2018-12-16 | End: 2018-12-16

## 2018-12-16 RX ORDER — POTASSIUM CHLORIDE 20 MEQ/1
40 TABLET, EXTENDED RELEASE ORAL EVERY 4 HOURS
Status: COMPLETED | OUTPATIENT
Start: 2018-12-16 | End: 2018-12-16

## 2018-12-16 RX ORDER — SODIUM CHLORIDE 9 MG/ML
INJECTION, SOLUTION INTRAVENOUS ONCE
Status: COMPLETED | OUTPATIENT
Start: 2018-12-16 | End: 2018-12-16

## 2018-12-16 RX ORDER — SPIRONOLACTONE 25 MG/1
12.5 TABLET ORAL DAILY
Status: DISCONTINUED | OUTPATIENT
Start: 2018-12-16 | End: 2018-12-22

## 2018-12-16 RX ORDER — SODIUM CHLORIDE 0.9 % (FLUSH) 0.9 %
10 SYRINGE (ML) INJECTION AS NEEDED
Status: DISCONTINUED | OUTPATIENT
Start: 2018-12-16 | End: 2018-12-22

## 2018-12-16 NOTE — PLAN OF CARE
Problem: Patient/Family Goals  Goal: Patient/Family Long Term Goal  Patient's Long Term Goal: Find source of blood loss. Interventions:  - Prep for EGD and colonoscopy.   - Monitor for signs of bleeding  - Monitor vitals  - See additional Care Plan goal Evaluate effectiveness of antiarrhythmic and heart rate control medications as ordered  - Initiate emergency measures for life threatening arrhythmias  - Monitor electrolytes and administer replacement therapy as ordered  Outcome: Progressing      Problem:

## 2018-12-16 NOTE — PROGRESS NOTES
[facility]    Progress Note    Nelson Goodman Patient Status:  Inpatient    1950 MRN [id number]   Location [facility] 3W/SW Attending Gisela Pineda MD   Hosp Day # 1 PCP Alejo Arellano       Subjective:   Carlos May Stra NEUROLOGICAL:  There was no focal deficit. Cranial nerves intact.          Current Scheduled Inpatient Meds:     • Potassium Chloride ER  40 mEq Oral Q4H   • pantoprazole (PROTONIX) IV push  40 mg Intravenous Q12H   • Fluticasone Furoate-Vilanterol  1 puf Electronically signed on 12/15/2018 at 17:47 by Elizabet Velazco and Plan:     Severe Anemia with HO + stool ( BARBY)  - s/p 2unit PRBC (2nd infusing now)   - Hb appropriately rising 6.9 today was 5.3 on admit.   - Transfuse 2 units of PRBC tonight.

## 2018-12-16 NOTE — PROGRESS NOTES
[facility]     Gastroenterology Progress Note    Liliana Medal Patient Status:  Inpatient    1950 MRN [id number]   Location [facility] 3W/SW Attending Héctor Robins MD   Hosp Day # 1 PCP [address] deficiency anemia: Confirmed. We will start IV Venofer  2. Chronic obstructive pulmonary disease as per pulmonary consult. 3.       Family history of colon cancer as above.     RECOMMENDATIONS:    1.          Continue clear liquid diet tomorrow and syncope, weakness, light-headedness and headaches. Hematological: Negative for adenopathy. Does not bruise/bleed easily. Psychiatric/Behavioral: Negative for behavioral problems and agitation. The patient is not nervous/anxious.     All other systems re Normal rate, regular rhythm, normal heart sounds and intact distal pulses. Exam reveals no gallop and no friction rub. No murmur heard. Edema not present. Pulmonary/Chest: Effort normal and breath sounds normal. No stridor. No respiratory distress. consolidation     Dictated by (CST): Antonieta Ortega MD on 12/15/2018 at 12:47     Approved by (CST): Antonieta Ortega MD on 12/15/2018 at 12:49          Ekg 12-lead    Result Date: 12/15/2018  ECG Report  Interpretation  --------------------------

## 2018-12-16 NOTE — PROGRESS NOTES
S: still SOB, edema    O:   Blood pressure 110/57, pulse 80, temperature 98.1 °F (36.7 °C), temperature source Oral, resp. rate 16, height 157.5 cm (5' 2\"), weight 90 lb 8 oz (41.1 kg), SpO2 100 %.        12/16/18  0931 12/16/18  1000 12/16/18  1100 12/16/ ipratropium-albuterol  3 mL Nebulization 4 times per day           Patient Active Problem List:     COPD (chronic obstructive pulmonary disease) ([facility] [address]     Essential hypertension     right C5-6 radiculopathy     Right carpal tunnel syndrome     Neck pain

## 2018-12-16 NOTE — PROGRESS NOTES
[facility] - [facility]     Progress Note        Evans Mariano Patient Status:  Inpatient    1950 MRN [id number]   Location [facility] 3W/SW Attending Padmini Burt MD   [facility] Day # 1 PCP Inez Garcia       Subjective:   Patient se breath sounds, no significant wheezing  GI: abdomen soft, non tender  Extremities: no clubbing, cyanosis, + lower extremity edema  Neurologic: no gross motor deficits  Skin: warm, dry      Results:     Lab Results   Component Value Date    WBC 3.2 12/16/20

## 2018-12-16 NOTE — PLAN OF CARE
Problem: Patient Centered Care  Goal: Patient preferences are identified and integrated in the patient's plan of care  Interventions:  - What would you like us to know as we care for you?  Home alone, uses o2 3 l n/c.  - Provide timely, complete, and accura supplementation based on oxygen saturation or ABGs  - Provide Smoking Cessation handout, if applicable  - Encourage broncho-pulmonary hygiene including cough, deep breathe, Incentive Spirometry  - Assess the need for suctioning and perform as needed  - Ass

## 2018-12-17 ENCOUNTER — APPOINTMENT (OUTPATIENT)
Dept: GENERAL RADIOLOGY | Facility: HOSPITAL | Age: 68
DRG: 329 | End: 2018-12-17
Attending: HOSPITALIST
Payer: MEDICAID

## 2018-12-17 ENCOUNTER — ANESTHESIA (OUTPATIENT)
Dept: ENDOSCOPY | Facility: HOSPITAL | Age: 68
DRG: 329 | End: 2018-12-17
Payer: MEDICAID

## 2018-12-17 ENCOUNTER — ANESTHESIA EVENT (OUTPATIENT)
Dept: ENDOSCOPY | Facility: HOSPITAL | Age: 68
DRG: 329 | End: 2018-12-17
Payer: MEDICAID

## 2018-12-17 ENCOUNTER — APPOINTMENT (OUTPATIENT)
Dept: CV DIAGNOSTICS | Facility: HOSPITAL | Age: 68
DRG: 329 | End: 2018-12-17
Attending: INTERNAL MEDICINE
Payer: MEDICAID

## 2018-12-17 ENCOUNTER — APPOINTMENT (OUTPATIENT)
Dept: GENERAL RADIOLOGY | Facility: HOSPITAL | Age: 68
DRG: 329 | End: 2018-12-17
Attending: INTERNAL MEDICINE
Payer: MEDICAID

## 2018-12-17 PROCEDURE — 0DUH8JZ SUPPLEMENT CECUM WITH SYNTHETIC SUBSTITUTE, VIA NATURAL OR ARTIFICIAL OPENING ENDOSCOPIC: ICD-10-PCS | Performed by: INTERNAL MEDICINE

## 2018-12-17 PROCEDURE — 45382 COLONOSCOPY W/CONTROL BLEED: CPT | Performed by: INTERNAL MEDICINE

## 2018-12-17 PROCEDURE — 93306 TTE W/DOPPLER COMPLETE: CPT | Performed by: INTERNAL MEDICINE

## 2018-12-17 PROCEDURE — 74019 RADEX ABDOMEN 2 VIEWS: CPT | Performed by: INTERNAL MEDICINE

## 2018-12-17 PROCEDURE — 0DB78ZX EXCISION OF STOMACH, PYLORUS, VIA NATURAL OR ARTIFICIAL OPENING ENDOSCOPIC, DIAGNOSTIC: ICD-10-PCS | Performed by: INTERNAL MEDICINE

## 2018-12-17 PROCEDURE — 0DB98ZX EXCISION OF DUODENUM, VIA NATURAL OR ARTIFICIAL OPENING ENDOSCOPIC, DIAGNOSTIC: ICD-10-PCS | Performed by: INTERNAL MEDICINE

## 2018-12-17 PROCEDURE — 99233 SBSQ HOSP IP/OBS HIGH 50: CPT | Performed by: INTERNAL MEDICINE

## 2018-12-17 PROCEDURE — 43239 EGD BIOPSY SINGLE/MULTIPLE: CPT | Performed by: INTERNAL MEDICINE

## 2018-12-17 PROCEDURE — 99231 SBSQ HOSP IP/OBS SF/LOW 25: CPT | Performed by: INTERNAL MEDICINE

## 2018-12-17 PROCEDURE — 71046 X-RAY EXAM CHEST 2 VIEWS: CPT | Performed by: HOSPITALIST

## 2018-12-17 PROCEDURE — 99233 SBSQ HOSP IP/OBS HIGH 50: CPT | Performed by: HOSPITALIST

## 2018-12-17 RX ORDER — SODIUM CHLORIDE 9 MG/ML
INJECTION, SOLUTION INTRAVENOUS
Status: COMPLETED
Start: 2018-12-17 | End: 2018-12-17

## 2018-12-17 RX ORDER — SODIUM CHLORIDE, SODIUM LACTATE, POTASSIUM CHLORIDE, CALCIUM CHLORIDE 600; 310; 30; 20 MG/100ML; MG/100ML; MG/100ML; MG/100ML
INJECTION, SOLUTION INTRAVENOUS CONTINUOUS
Status: DISCONTINUED | OUTPATIENT
Start: 2018-12-17 | End: 2018-12-18

## 2018-12-17 RX ORDER — LORAZEPAM 2 MG/ML
0.5 INJECTION INTRAMUSCULAR ONCE
Status: COMPLETED | OUTPATIENT
Start: 2018-12-17 | End: 2018-12-17

## 2018-12-17 RX ORDER — LIDOCAINE HYDROCHLORIDE 10 MG/ML
INJECTION, SOLUTION EPIDURAL; INFILTRATION; INTRACAUDAL; PERINEURAL AS NEEDED
Status: DISCONTINUED | OUTPATIENT
Start: 2018-12-17 | End: 2018-12-17 | Stop reason: SURG

## 2018-12-17 RX ORDER — METRONIDAZOLE 500 MG/100ML
500 INJECTION, SOLUTION INTRAVENOUS EVERY 8 HOURS
Status: DISCONTINUED | OUTPATIENT
Start: 2018-12-17 | End: 2018-12-22

## 2018-12-17 RX ORDER — POTASSIUM CHLORIDE 20 MEQ/1
40 TABLET, EXTENDED RELEASE ORAL EVERY 4 HOURS
Status: DISCONTINUED | OUTPATIENT
Start: 2018-12-17 | End: 2018-12-17

## 2018-12-17 RX ORDER — NALOXONE HYDROCHLORIDE 0.4 MG/ML
80 INJECTION, SOLUTION INTRAMUSCULAR; INTRAVENOUS; SUBCUTANEOUS AS NEEDED
Status: DISCONTINUED | OUTPATIENT
Start: 2018-12-17 | End: 2018-12-17 | Stop reason: HOSPADM

## 2018-12-17 RX ADMIN — LIDOCAINE HYDROCHLORIDE 50 MG: 10 INJECTION, SOLUTION EPIDURAL; INFILTRATION; INTRACAUDAL; PERINEURAL at 11:35:00

## 2018-12-17 RX ADMIN — SODIUM CHLORIDE, SODIUM LACTATE, POTASSIUM CHLORIDE, CALCIUM CHLORIDE: 600; 310; 30; 20 INJECTION, SOLUTION INTRAVENOUS at 11:31:00

## 2018-12-17 RX ADMIN — SODIUM CHLORIDE, SODIUM LACTATE, POTASSIUM CHLORIDE, CALCIUM CHLORIDE: 600; 310; 30; 20 INJECTION, SOLUTION INTRAVENOUS at 12:20:00

## 2018-12-17 NOTE — CM/SW NOTE
Patient's insurance is out of network - [employer]. Provided in-network facility list. Explained possible out of pocket costs and referred to financial counselor if needed. PCP is Dr. Dahiana Arteaga affiliated [facility].  Patient verbalized u

## 2018-12-17 NOTE — PROGRESS NOTES
Pulmonary/Critical Care Follow Up Note    HPI:   Priscilla Beck is a 76year old female with Patient presents with:  Dyspnea ANNE SOB (respiratory)      PCP Raul oHod  Admission Attending Aaron Montero 15 Day #2    frustrated  \"I don't Intravenous, Q6H PRN  •  acetaminophen (TYLENOL) tab 650 mg, 650 mg, Oral, Q6H PRN  •  ipratropium-albuterol (DUONEB) nebulizer solution 3 mL, 3 mL, Nebulization, 4 times per day      Allergies:    Penicillins             OTHER (SEE COMMENTS)    Comment: Wa

## 2018-12-17 NOTE — OPERATIVE REPORT
[facility]    PATIENT'S NAME: Cecile Manley   ATTENDING PHYSICIAN: Derrell Bonilla MD   OPERATING PHYSICIAN: Frankie Quiros MD   PATIENT ACCOUNT#:   [id number]    LOCATION:  [address] ROOM 13 [place] [address] traumatic erythema of the cecum, but without the appearance of classic vascular malformations. The endoscope was slowly withdrawn. There were no polyps or mass lesions.   There were scattered diverticula throughout the colon, but more in the left hemicolo

## 2018-12-17 NOTE — PLAN OF CARE
Problem: Patient/Family Goals  Goal: Patient/Family Long Term Goal  Patient's Long Term Goal: Find source of blood loss. Interventions:  - Prep for EGD and colonoscopy.   - Monitor for signs of bleeding  - Monitor vitals  - See additional Care Plan goal fluid balance, assess for edema, trend weights  Outcome: Progressing    Goal: Absence of cardiac arrhythmias or at baseline  INTERVENTIONS:  - Continuous cardiac monitoring, monitor vital signs, obtain 12 lead EKG if indicated  - Evaluate effectiveness of Meropenem. Will most likely remain NPO for 24hours and then had CT of abd and pelvis tomorrow.

## 2018-12-17 NOTE — ANESTHESIA POSTPROCEDURE EVALUATION
Patient: Christiane Handy    Procedure Summary     Date:  12/17/18 Room / Location:  [address] [place] [address] ENDOSCOPY 05 / [address] [place] [address] ENDOSCOPY    Anesthesia Start:  2723 Anesthesia Stop:  5555    Procedures:       COLONOSCOPY (N/A )      ESOPHAGOGASTRODUODENOSCOPY (EGD) (N/A ) D

## 2018-12-17 NOTE — PROGRESS NOTES
[facility] Pharmacy Note:  Renal Adjustment for Meropenem (MERREM) 500 mg in sodium chloride 0.9% 100 mL     Carlo Perez is a 76year old female who has been prescribed Meropenem (MERREM) 500 mg in sodium chloride 0.9% 100 mL  every 24 hrs.   CrCl is estima

## 2018-12-17 NOTE — H&P
History & Physical Examination    Patient Name: Hailee Alvarez  MRN: [id number]  [facility]: [id number]  YOB: 1950    Diagnosis: iron def anemia, family history of colon cancer    Medications Prior to Admission:  ASPIRIN LOW DOSE 81 MG Oral Tab 20 mg Intravenous BID (Diuretic)   ALPRAZolam (XANAX) tab 1 mg 1 mg Oral BID PRN   losartan (COZAAR) 100 mg, hydrochlorothiazide (MICROZIDE) 12.5 mg for Hyzaar 100/12.5 (EEH only)  Oral Daily   metoprolol Tartrate (LOPRESSOR) tab 25 mg 25 mg Oral 2x Daily( patient/family. They understand and agree to proceed with plan of care. I have reviewed the History and Physical done within the last 30 days. Any changes noted above.   Margarita Everett, [address] [place] [address] - Gastroenterology  12/17/2018  11:3

## 2018-12-17 NOTE — PROGRESS NOTES
[facility]     Gastroenterology Progress Note    Dae Green Patient Status:  Inpatient    1950 MRN [id number]   Location [facility] 3W/SW Attending Kristian Pimentel MD   [facility] Day # 2 PCP Meredith FREITAS 12/16/18 1830 119/62 98.3 °F (36.8 °C) Oral 93 18 99 % — —   12/16/18 1742 112/54 98.8 °F (37.1 °C) Oral 84 16 100 % — —   12/16/18 1623 111/55 98.2 °F (36.8 °C) Oral 90 16 97 % — —     Body mass index is 16.55 kg/m².     Physical Exam    Constitutional: by (CST): Ari Westbrook MD on 12/17/2018 at 9:07          Xr Chest Pa + Lat Chest (cpt=71046)    Result Date: 12/15/2018  CONCLUSION:  1. Hyperlucency/COPD prominent basilar interstitium.  2. No acute pulmonary consolidation     Dictated by (CST): Stone suspicious for perforated viscus.    Dictated by (CST): Pam Costello MD on 12/17/2018 at 13:45     Approved by (CST): Pam Costello MD on 12/17/2018 at 13:55                Patric Garrido  12/17/2018

## 2018-12-17 NOTE — BRIEF OP NOTE
Pre-Operative Diagnosis: iron deficiency anemia, family history of colon cancer     Post-Operative Diagnosis: Diverticulosis, small cecal defect secondary to barotrauma status post clipping, moderate internal hemorrhoids, hiatal hernia, tortuous esophagus,

## 2018-12-17 NOTE — PROGRESS NOTES
[facility]    Progress Note    Dae Green Patient Status:  Inpatient    1950 MRN [id number]   Location [facility] ENDOSCOPY LAB SUITES Attending Kristian Pimentel MD   [facility] Day # 2 PCP Sarah Ge and nebs  - supplemental oxygen as needed to keep spo2 > 88%     Hypokalemia  - secondary to lasix  - replace.  Check mg and replace too      Thrombocytopenia  - rpt CBC in AM.   - not on any anticoagulation or antiplatelet agents.      Anxiety d/o   - cont

## 2018-12-17 NOTE — DIETARY NOTE
ADULT NUTRITION INITIAL ASSESSMENT    Pt is at moderate nutrition risk. Pt meets malnutrition criteria.       CRITERIA FOR MALNUTRITION DIAGNOSIS:  Criteria for non-severe malnutrition diagnosis: chronic illness related to body fat mild depletion and muscl underweight  IBW: 105 lbs        86% IBW  Usual Body Wt: 90-99 lbs       91% UBW  WEIGHT HISTORY:  Patient Weight(s) for the past 336 hrs:   Weight   12/17/18 0300 41.1 kg (90 lb 8 oz)   12/16/18 0511 41.1 kg (90 lb 8 oz)   12/15/18 1146 43.1 kg (95 lb)

## 2018-12-17 NOTE — PROGRESS NOTES
12/16/18 1924   Clinical Encounter Type   Visited With Patient   Routine Visit Introduction   Continue Visiting Yes   Surgical Visit Pre-op   Patient's Supportive Strategies/Resources family   Patient Spiritual Encounters   Spiritual Assessment Complete

## 2018-12-17 NOTE — PROGRESS NOTES
[facility]  Progress Note    Isa Solis Patient Status:  Inpatient    1950 MRN [id number]   Location [facility] 3W/SW Attending Pema Washington MD   [facility] Day # 2 PCP Boyd Neri     Assessment:    1.   Iron deficiency anemi Lab Results   Component Value Date    INR 1.0 12/15/2018     Lab Results   Component Value Date     12/17/2018    K 3.6 12/17/2018    CL 90 12/17/2018    CO2 32 12/17/2018    BUN 3 12/17/2018    CREATSERUM 0.43 12/17/2018    GLU 96 12/17/2018

## 2018-12-17 NOTE — ANESTHESIA PREPROCEDURE EVALUATION
Anesthesia PreOp Note    HPI:     Geovanni Hammer is a 76year old female who presents for preoperative consultation requested by: Tata Chaves MD    Date of Surgery: 12/15/2018 - 12/17/2018    Procedure(s):  COLONOSCOPY  ESOPHAGOGASTRODU O2 at home.    • Essential hypertension    • High blood pressure    • Shortness of breath        Past Surgical History:   Procedure Laterality Date   • LUMBAR INTERLAMINAR EPIDURAL INJECTION Bilateral 11/16/2018    Performed by Silas Serrato MD at [place] [address] 10 mL IV push 40 mg Intravenous Q12H Hattie Calhoun MD 40 mg at 12/17/18 1609   spironolactone (ALDACTONE) tab 12.5 mg 12.5 mg Oral Daily Yinka Marie MD 12.5 mg at 12/17/18 0278   ipratropium-albuterol (DUONEB) nebulizer solution 3 mL 3 mL medical: Not on file      Transportation needs - non-medical: Not on file    Occupational History      Not on file    Tobacco Use      Smoking status: Former Smoker        Packs/day: 1.00        Years: 30.00        Pack years: 30        Types: Cigarettes respiration is 18 and oxygen saturation is 99%.     12/17/18  0449 12/17/18  0819 12/17/18  1013 12/17/18  1052   BP: 133/72 129/72  125/59   BP Location: Right arm Left arm  Right arm   Pulse: 89   70   Resp: 18 20  18   Temp: 99.1 °F (37.3 °C) 98.1 °F (36

## 2018-12-18 ENCOUNTER — APPOINTMENT (OUTPATIENT)
Dept: CT IMAGING | Facility: HOSPITAL | Age: 68
DRG: 329 | End: 2018-12-18
Attending: INTERNAL MEDICINE
Payer: MEDICAID

## 2018-12-18 PROCEDURE — 74176 CT ABD & PELVIS W/O CONTRAST: CPT | Performed by: INTERNAL MEDICINE

## 2018-12-18 PROCEDURE — 99233 SBSQ HOSP IP/OBS HIGH 50: CPT | Performed by: HOSPITALIST

## 2018-12-18 PROCEDURE — 99232 SBSQ HOSP IP/OBS MODERATE 35: CPT | Performed by: INTERNAL MEDICINE

## 2018-12-18 RX ORDER — SODIUM CHLORIDE 9 MG/ML
INJECTION, SOLUTION INTRAVENOUS CONTINUOUS
Status: DISCONTINUED | OUTPATIENT
Start: 2018-12-18 | End: 2018-12-20

## 2018-12-18 RX ORDER — LORAZEPAM 2 MG/ML
0.5 INJECTION INTRAMUSCULAR EVERY 8 HOURS PRN
Status: DISCONTINUED | OUTPATIENT
Start: 2018-12-18 | End: 2018-12-19

## 2018-12-18 RX ORDER — FUROSEMIDE 20 MG/1
20 TABLET ORAL DAILY
Status: DISCONTINUED | OUTPATIENT
Start: 2018-12-19 | End: 2018-12-22

## 2018-12-18 NOTE — PROGRESS NOTES
[facility] - [facility]    Progress Note    Ok Dies Patient Status:  Inpatient    1950 MRN [id number]   Location [facility] ENDOSCOPY LAB SUITES Attending Dani Bush MD   [facility] Day # 3 PCP [address] in am.    Dyspnea   - Likely secondary to above. - BNP elevated > 1000 though CXR clear and no prev h/o CHF per pt  - Cards following  - Baseline weight 95lbs.  Daily weights I/Os  - Cont lasix at this time, plan to switch to PO in am.     H/o COPD  - CXR throughout the abdomen and pelvis, consistent with perforated viscus. There are surgical clips in the region of the cecum. Close followup is suggested to assess for resolution of pneumoperitoneum. 2. Cirrhotic liver morphology.  Perisplenic collaterals may significant. BONES: Mild degenerative change within the spine. OTHER: Negative. CONCLUSION: Small quantity of free air beneath the hemidiaphragms. This is suspicious for perforated viscus.    Dictated by (CST): Jeny Deluca MD on 12/17/2018 at 13:45

## 2018-12-18 NOTE — PROGRESS NOTES
[facility] - [facility]    Cardiology Progress Note    Lopez Contreras Patient Status:  Inpatient    1950 MRN [id number]   Location [facility] 3W/SW Attending Pallavi Stahl MD   Hosp Day # 3  [facility] • lactated ringers     • lactated ringers         Exam  Gen: No acute distress, alert and oriented x3  Pulm: Lungs clear  Neck: No JVD  CV: S1 and S2 regular rate and rhythm, no S3, No Rub, No Murmur  Abd: Abdomen soft, nontender, nondistended  Extremiti Significant free air is seen throughout the abdomen and pelvis, consistent with perforated viscus. There are surgical clips in the region of the cecum. Close followup is suggested to assess for resolution of pneumoperitoneum.   2. Cirrhotic liver morphology organomegaly. CALCIFICATIONS: None significant. BONES: Mild degenerative change within the spine. OTHER: Negative. CONCLUSION: Small quantity of free air beneath the hemidiaphragms. This is suspicious for perforated viscus.    Dictated by (CST): Carrington Graves

## 2018-12-18 NOTE — PROGRESS NOTES
[facility]     Gastroenterology Progress Note    Fifi Huitron Patient Status:  Inpatient    1950 MRN [id number]   Location [facility] 3W/SW Attending Douglas Bal MD   [address] Day # 3 PCP Ewa Contreras       A 1639 134/70 98.2 °F (36.8 °C) Oral 81 20 96 % —     Body mass index is 16.1 kg/m². Gen- Patient appears comfortable and in no acute distress. Looks older than her stated age. HEENT:Negative for scleral icterus.   Mucous membranes are moist.  CV- regula unchanged.  3. Small left-sided effusion/pleural reaction has progressed     Dictated by (CST): Cely Villeda MD on 12/17/2018 at 9:05     Approved by (CST): Cely Villeda MD on 12/17/2018 at 9:07          Ct Abdomen+pelvis(cpt=74176)    Result metallic clips within the right lower quadrant. Prominent gaseous distention of the proximal colon in the right lower quadrant. No significant small bowel dilatation or deviation. FREE AIR:   There is a band of free air beneath both hemidiaphragms.  SOFT

## 2018-12-18 NOTE — PAYOR COMM NOTE
ADMITTED TO [facility]    ADMISSION REVIEW     Payor: [employer] [address] #:  [id number]  Authorization Number: [id number]    Admit date: 12/15/18  [facility]       Admitting Physician: Vince Hernandez MD  Attending Physician:  Jolyn Libman, SURGICAL HISTORY      R hand tedon release           Social History    Tobacco Use      Smoking status: Former Smoker        Packs/day: 1.00        Years: 30.00        Pack years: 30        Types: Cigarettes      Smokeless tobacco: Never Used      Tobacco BUN 7 (*)     Creatinine 0.47 (*)     Calculated Osmolality 272 (*)     All other components within normal limits   BNP (B TYPE NATRIUERTIC PEPTIDE) - Abnormal; Notable for the following components:    Beta Natriuretic Peptide 1,131 (*)     All other compo Normal  Reading: Nonspecific EKG                  MDM   Lab, x-ray, and EKG results noted. Patient with Hemoccult positive stool and anemia. Will admit for further evaluation for gastrointestinal bleeding.   Will also treat for congestive heart failure ex 4/26/1950    Diagnosis: iron def anemia, family history of colon cancer    Medications Prior to Admission:  ASPIRIN LOW DOSE 81 MG Oral Tab EC TAKE 1 TABLET BY MOUTH EVERY DAY Disp: 90 tablet Rfl: 0 12/15/2018 at 0800   METOPROLOL TARTRATE 25 MG Oral Tab T hydrochlorothiazide (MICROZIDE) 12.5 mg for Hyzaar 100/12.5 (EE[facility] only)  Oral Daily   metoprolol Tartrate (LOPRESSOR) tab 25 mg 25 mg Oral 2x Daily(Beta Blocker)   ondansetron HCl (ZOFRAN) injection 4 mg 4 mg Intravenous Q6H PRN   acetaminophen (TYLENOL) ta within the last 30 days. Any changes noted above.   Pedro Pelaez, [address] [place] [address] - Gastroenterology  12/17/2018  11:32 AM          Electronically signed by Joanna Morse MD on 12/17/2018 11:33 AM         MEDICATIONS ADMINIST Date Action Dose Route User    12/18/2018 0813 New Bag 500 mg Intravenous Marti Faye RN    12/17/2018 2347 New Bag 500 mg Intravenous Krystin Hernandes RN    12/17/2018 1542 New Bag 500 mg Intravenous Marti Faye RN      metoprolol Tartrate (Gerardine Holy Dose Route User    12/18/2018 0820 Given 12.5 mg Oral August Macedo RN        12/15 PULMONARY CONSULT  Date of Admission:  12/15/2018     Reason for Consultation:   COPD     History of Present Illness:   Patient is a 78-year-old female with underlying hi 12/15/2018     HCT 17.1 (L) 12/15/2018      12/15/2018     CREATSERUM 0.47 (L) 12/15/2018     BUN 7 (L) 12/15/2018      (L) 12/15/2018     K 4.1 12/15/2018     CL 92 (L) 12/15/2018     CO2 36 (H) 12/15/2018     GLU 92 12/15/2018     CA 8.6 12/ any hematochezia, hemoptysis, melena.  Denies recent or prior history of known GI bleeding. neckveins mildly elevated, BNP high.   CXR clear.      Patient Active Problem List:     COPD (chronic obstructive pulmonary disease) ([facility] [place] [address]     Essential hypertension epigastric pain in the last several weeks.   She had felt increasing fatigue, malaise, and shortness of breath over the last several weeks and so finally presented to the emergency room.      PAST MEDICAL HISTORY:  Chronic back pain, congestive heart failur questioning.     LABORATORY DATA:  Hemoglobin 5.3, hematocrit 17.1, WBC 3.5. BUN 7, creatinine 0.47, sodium 132, potassium 4.1, chloride 92, bicarb 36, glucose 92. PT/INR 13.1 and 1.1 respectively.   Troponin 0.01.     I cannot find any other previous hem colonoscopy or EGD.   Plan for colonoscopy and EGD tomorrow. Plan to transfuse 2 more units of blood today. 2.       Iron deficiency anemia: Confirmed. We will start IV Venofer  2.       Chronic obstructive pulmonary disease as per pulmonary consult. 12/15/2018 at 12:47     Approved by (CST): Derick Ashford MD on 12/15/2018 at 12:49           Ekg 12-lead     Result Date: 12/15/2018  ECG Report  Interpretation  -------------------------- Sinus Rhythm - Negative precordial T-waves -Probably normal - weight 90 lb 8 oz (41.1 kg), SpO2 100 %.       Assessment   1.  Severe anemia  2.  COPD  3.  Dyspnea with exertion  4.  Prior nicotine dependence  5.  Hyponatremia            Plan   -Patient presents with evidence of increased dyspnea, generalized malaise, routine. 4.       Gastropathy status post gastric biopsies.   PROCEDURE:  Esophagogastroduodenoscopy with biopsy.     12/17 HOSPITALIST PROGRESS NOTE  Objective:   Blood pressure 128/61, pulse 67, temperature 98.1 °F (36.7 °C), temperature source Oral, res anticoagulation or antiplatelet agents.      Anxiety d/o   - cont home meds     Chronic back pain   - better after BRENT in Nov 18'  - Monitor   - tylenol PRN      HTN  - cont home meds.         Quality:  · DVT Prophylaxis: SCD due to anemia/poss bleeding  ·

## 2018-12-19 PROCEDURE — 99232 SBSQ HOSP IP/OBS MODERATE 35: CPT | Performed by: INTERNAL MEDICINE

## 2018-12-19 PROCEDURE — 99231 SBSQ HOSP IP/OBS SF/LOW 25: CPT | Performed by: SURGERY

## 2018-12-19 PROCEDURE — 99233 SBSQ HOSP IP/OBS HIGH 50: CPT | Performed by: HOSPITALIST

## 2018-12-19 RX ORDER — MAGNESIUM SULFATE HEPTAHYDRATE 40 MG/ML
2 INJECTION, SOLUTION INTRAVENOUS ONCE
Status: COMPLETED | OUTPATIENT
Start: 2018-12-19 | End: 2018-12-19

## 2018-12-19 RX ORDER — LORAZEPAM 1 MG/1
1 TABLET ORAL 2 TIMES DAILY PRN
Status: DISCONTINUED | OUTPATIENT
Start: 2018-12-19 | End: 2018-12-22

## 2018-12-19 RX ORDER — POTASSIUM CHLORIDE 14.9 MG/ML
20 INJECTION INTRAVENOUS ONCE
Status: COMPLETED | OUTPATIENT
Start: 2018-12-19 | End: 2018-12-19

## 2018-12-19 NOTE — PROGRESS NOTES
[facility]    Progress Note    Liliana Medal Patient Status:  Inpatient    1950 MRN [id number]   Location [facility] 3W/SW Attending Héctor Robins MD   Hosp Day # 4 PCP BERENICE BRANHAM     Assessment and Plan:     Stable 12/15/2018    B12 208 12/15/2018       Ct Abdomen+pelvis(cpt=74176)    Result Date: 12/18/2018  CONCLUSION:  1. Significant free air is seen throughout the abdomen and pelvis, consistent with perforated viscus.  There are surgical clips in the region of the FREE AIR:   There is a band of free air beneath both hemidiaphragms. SOFT TISSUES: Normal. No masses or organomegaly. CALCIFICATIONS: None significant. BONES: Mild degenerative change within the spine. OTHER: Negative.    CONCLUSION: Small quantity of free

## 2018-12-19 NOTE — CM/SW NOTE
Received  A call from patients  [facility] for  [facility] [phone] for assistance in Discharge planning. / to remain available for support and/or discharge planning.          Mary Bustamante RN Lucas Slater

## 2018-12-19 NOTE — PLAN OF CARE
Problem: Patient/Family Goals  Goal: Patient/Family Long Term Goal  Patient's Long Term Goal: Find source of blood loss. Interventions:  - Prep for EGD and colonoscopy.   - Monitor for signs of bleeding  - Monitor vitals  - See additional Care Plan goal indicated  - Evaluate effectiveness of antiarrhythmic and heart rate control medications as ordered  - Initiate emergency measures for life threatening arrhythmias  - Monitor electrolytes and administer replacement therapy as ordered  Outcome: Progressing

## 2018-12-19 NOTE — CONSULTS
[facility]    PATIENT'S NAME: Cecile Manley   ATTENDING PHYSICIAN: Derrell Bonilla MD   CONSULTING PHYSICIAN: Kezia Bridges MD   PATIENT ACCOUNT#:   [id number]    LOCATION:  3WS[facility] #:   [id number]       DATE OF BI yesterday were unremarkable, and the gastric antrum and body biopsies were also unremarkable and negative for Helicobacter pylori.   She had some increased pain after the procedure yesterday, and an abdominal obstructive series showed some free air beneath HISTORY:  No known colorectal disease. REVIEW OF SYSTEMS:  Otherwise negative. This was the patient's first colonoscopy. PHYSICAL EXAMINATION:    GENERAL:  Elderly female in mild distress.   She appears chronically ill with shortness of breath an anemia and optimizing her medical conditions. She is on Protonix.      Dictated By Florence Mcmanus MD  d: 12/18/2018 16:54:35  t: 12/18/2018 17:58:17  [facility] [id number]  BE/

## 2018-12-19 NOTE — PROGRESS NOTES
[facility]    Progress Note      Assessment and Plan:   1. Severe anemia with GI bleeding–scope has revealed internal hemorrhoids, cecal defect, gastropathy and hiatal hernia. The hemoglobin is now 11.8.     Recommendations: As per Fabiola Clay consistent with perforated viscus. There are surgical clips in the region of the cecum. Close followup is suggested to assess for resolution of pneumoperitoneum. 2. Cirrhotic liver morphology.  Perisplenic collaterals may reflect sequela of portal hyper

## 2018-12-19 NOTE — PROGRESS NOTES
[facility]  Progress Note    Lopez Contreras Patient Status:  Inpatient    1950 MRN [id number]   Location [facility] 3W/SW Attending Hannah Dover MD   Hosp Day # 4 PCP BERENICE BRANHAM     Assessment:    1.   Severe mitral regurgita faint holosystolic murmur  Lungs: Clear without wheezes, rales, rhonchi. Normal excursions and effort. Abdomen: Soft, non-tender. Extremities: Without clubbing, cyanosis or edema. Skin: Warm and dry.      Labs:  Lab Results   Component Value Date

## 2018-12-19 NOTE — PROGRESS NOTES
[facility]    Progress Note    Leo Valadez Patient Status:  Inpatient    1950 MRN [id number]   Location [facility] ENDOSCOPY LAB SUITES Attending Linnea Smallwood MD   [facility] Day # 4 PCP BERENICE Medellin    Hyponatremia  - will change LR to 0.9 NS  - repeat labs in am.    Dyspnea   - Likely secondary to above. - BNP elevated > 1000 though CXR clear and no prev h/o CHF per pt  - Cards following, will evaluate patient outpatient  - Baseline weight 95lbs. which may relate to history of perforation or, potentially, reflect portal hypertension. 4. Uncomplicated distal colonic diverticulosis. 5. Diffuse gastric wall thickening, most likely related to underdistention.   6. Low-density appearance of the intraca discussed with Dr. Nan Diaz on 12/17/18 at 2:08 p.m.   Dictated by (CST): Donnell Tong MD on 12/17/2018 at 14:08     Approved by (CST): Donnell Tong MD on 12/17/2018 at 14:08          Result Date: 12/17/2018  CONCLUSION: Small quantity of free air benea

## 2018-12-20 ENCOUNTER — APPOINTMENT (OUTPATIENT)
Dept: GENERAL RADIOLOGY | Facility: HOSPITAL | Age: 68
DRG: 329 | End: 2018-12-20
Attending: NURSE PRACTITIONER
Payer: MEDICAID

## 2018-12-20 ENCOUNTER — APPOINTMENT (OUTPATIENT)
Dept: CT IMAGING | Facility: HOSPITAL | Age: 68
DRG: 329 | End: 2018-12-20
Attending: HOSPITALIST
Payer: MEDICAID

## 2018-12-20 PROCEDURE — 99231 SBSQ HOSP IP/OBS SF/LOW 25: CPT | Performed by: SURGERY

## 2018-12-20 PROCEDURE — 99232 SBSQ HOSP IP/OBS MODERATE 35: CPT | Performed by: INTERNAL MEDICINE

## 2018-12-20 PROCEDURE — 71045 X-RAY EXAM CHEST 1 VIEW: CPT | Performed by: NURSE PRACTITIONER

## 2018-12-20 PROCEDURE — 74176 CT ABD & PELVIS W/O CONTRAST: CPT | Performed by: HOSPITALIST

## 2018-12-20 PROCEDURE — 99233 SBSQ HOSP IP/OBS HIGH 50: CPT | Performed by: HOSPITALIST

## 2018-12-20 RX ORDER — POTASSIUM CHLORIDE 20 MEQ/1
40 TABLET, EXTENDED RELEASE ORAL EVERY 4 HOURS
Status: COMPLETED | OUTPATIENT
Start: 2018-12-20 | End: 2018-12-20

## 2018-12-20 RX ORDER — SODIUM CHLORIDE 9 MG/ML
INJECTION, SOLUTION INTRAVENOUS
Status: COMPLETED
Start: 2018-12-20 | End: 2018-12-20

## 2018-12-20 RX ORDER — LOSARTAN POTASSIUM 100 MG/1
100 TABLET ORAL DAILY
Status: DISCONTINUED | OUTPATIENT
Start: 2018-12-21 | End: 2018-12-22

## 2018-12-20 RX ORDER — POTASSIUM CHLORIDE 14.9 MG/ML
20 INJECTION INTRAVENOUS ONCE
Status: DISCONTINUED | OUTPATIENT
Start: 2018-12-20 | End: 2018-12-20

## 2018-12-20 RX ORDER — MAGNESIUM SULFATE HEPTAHYDRATE 40 MG/ML
2 INJECTION, SOLUTION INTRAVENOUS ONCE
Status: COMPLETED | OUTPATIENT
Start: 2018-12-20 | End: 2018-12-20

## 2018-12-20 NOTE — PROGRESS NOTES
[facility]    Progress Note    Alyson Liu Patient Status:  Inpatient    1950 MRN [id number]   Location [facility] 3W/SW Attending José Miguel Don MD   Hosp Day # 5 PCP BERENICE BRANHAM     Assessment and Plan:     Contin (L) 12/20/2018    BUN 3 (L) 12/20/2018     (L) 12/20/2018    K 3.0 (L) 12/20/2018    CL 94 (L) 12/20/2018    CO2 28 12/20/2018     (H) 12/20/2018    CA 8.1 (L) 12/20/2018    INR 1.0 12/15/2018    MG 1.6 (L) 12/20/2018    PHOS 3.2 12/19/2018

## 2018-12-20 NOTE — PROGRESS NOTES
[facility]    Progress Note    Henreitta Situ Patient Status:  Inpatient    1950 MRN [id number]   Location [facility] 3W/SW Attending Kassandra Jacobs MD   Hosp Day # 5 PCP Clovis Mejia     CARDIOLOGY ATTENDING    Chart 12/20/2018     (L) 12/20/2018    CREATSERUM 0.47 (L) 12/20/2018    BUN 3 (L) 12/20/2018     (L) 12/20/2018    K 3.0 (L) 12/20/2018    CL 94 (L) 12/20/2018    CO2 28 12/20/2018     (H) 12/20/2018    CA 8.1 (L) 12/20/2018    INR 1.0 12/15

## 2018-12-20 NOTE — CM/SW NOTE
12/20/18 1300   CM/SW Screening   Patient's Mental Status Alert;Oriented   Patient's Home Environment Condo/Apt with elevator   Number of Levels in Home 1   Patient lives with Alone   Patient Status Prior to Admission   Independent with ADLs and Mobilit

## 2018-12-20 NOTE — BH PROGRESS NOTE
Behavioral Health Note  CHIEF COMPLAINT  Dyspnea  REASON FOR ADMISSION  Dyspnea    SOCIAL HISTORY  Jazz Raya lives alone and is . She does not smoke, drink or use drugs. PAST PSYCHIATRIC HISTORY  Jazz Raya has a h/o anxiety and takes Xanax at home.    SUBJ jimenez.   Celeste Santacruz LCSW

## 2018-12-20 NOTE — PROGRESS NOTES
[facility]     Gastroenterology Progress Note    Som Ward Patient Status:  Inpatient    1950 MRN [id number]   Location [facility] 3W/SW Attending Karli Tony MD   [facility] Day # 4 PCP [address] 12/17/18   0444  12/18/18   0518  12/19/18   0504   RBC  3.88  4.19  4.11   HGB  11.2*  12.1  11.8*   HCT  33.2*  36.0  35.7   MCV  85.7  85.9  86.8   MCH  28.8  28.8  28.8   MCHC  33.6  33.6  33.1   RDW  15.2*  15.4*  15.6*   WBC  3.9*  4.4  4.0   PLT  11 examination were discussed with the patient's physician, Dr. Clare Linares, by Dr. Rosa Urena at 9494 on 12/18/2018.    Dictated by (CST): Jonathan Garay MD on 12/18/2018 at 8:28     Approved by (CST): Jonathan Garay MD on 12/18/2018 at [address] 12/17/2018 at 13:55                  Angie Fernandez  12/19/2018

## 2018-12-20 NOTE — PROGRESS NOTES
[facility]    Progress Note    Hailee Alvarez Patient Status:  Inpatient    1950 MRN [id number]   Location [facility] ENDOSCOPY LAB SUITES Attending Jaison Miller MD   [address] Day # 5 PCP BERENICE Armenta currently hgb stable   - GI following  - Has been taking NSAIDS daily for back pain. - PPI therapy      Hyponatremia  - will change LR to 0.9 NS  - repeat labs in am.    Dyspnea   - Likely secondary to above.    - BNP elevated > 1000 though CXR clear and today   > No abd pain no N/V tolerating FLD  > Rpt CT tomorrow if improvd advance diet.  Poss d/c Saturday     Vira Batres MD

## 2018-12-20 NOTE — PROGRESS NOTES
[facility]     Gastroenterology Progress Note    Hannah Saldaña Patient Status:  Inpatient    1950 MRN [id number]   Location [facility] 3W/SW Attending Sandra Bhat MD   Hosp Day # 5 PCP [address] rhythm   Lung- Clear to auscultation bilaterally  Abdomen-Non-distended. Bowel sounds are present and quite active. There is absolutely no tenderness to palpation. There are no masses appreciated. Liver and spleen are not palpable. Skin- No jaundice. parameters. 7. Lesser incidental findings as above. Results of this examination were discussed with the patient's physician, Dr. Jin Espinoza, by Dr. Beatrice Saunders at 4069 on 12/18/2018.    Dictated by (CST): Melodie Barboza MD on 12/18/2018 at 8:28     Appr Levander Meckel, MD on 12/17/2018 at 13:45     Approved by (CST): Pan Nunez MD on 12/17/2018 at 13:55                  Patt July 12/20/2018

## 2018-12-21 PROCEDURE — 99232 SBSQ HOSP IP/OBS MODERATE 35: CPT | Performed by: INTERNAL MEDICINE

## 2018-12-21 PROCEDURE — 99233 SBSQ HOSP IP/OBS HIGH 50: CPT | Performed by: HOSPITALIST

## 2018-12-21 RX ORDER — MAGNESIUM OXIDE 400 MG (241.3 MG MAGNESIUM) TABLET
400 TABLET ONCE
Status: COMPLETED | OUTPATIENT
Start: 2018-12-21 | End: 2018-12-21

## 2018-12-21 NOTE — PROGRESS NOTES
[facility]    Progress Note    Salomebakari Sanchez Patient Status:  Inpatient    1950 MRN [id number]   Location [facility] ENDOSCOPY LAB SUITES Attending Jami Smith MD   [facility] Day # 6 PCP BERENICE Rueda following  - Has been taking NSAIDS daily for back pain. - PPI therapy      Hyponatremia  - will change LR to 0.9 NS  - fluid restriction  - repeat labs in am.    Dyspnea   - Likely secondary to above.    - BNP elevated > 1000 though CXR clear and no prev 3. Cirrhosis. 4. Emphysema. 5. Trace left pleural effusion.   Dictated by (CST): Steven Harrison MD on 12/20/2018 at 14:53     Approved by (CST): Steven Harrison MD on 12/20/2018 at 15:03          Xr Chest Ap Portable  (cpt=71045)    Result Date: 12/

## 2018-12-21 NOTE — PROGRESS NOTES
[facility]  Progress Note    Priscilla Beck Patient Status:  Inpatient    1950 MRN [id number]   Location [facility] 3W/SW Attending Camron Buckley MD   Hosp Day # 6 PCP BERENICE BRANHAM     Assessment:    1.   Severe mitral regurgita 99 12/21/2018    MG 1.6 12/21/2018    CA 8.4 12/21/2018        Lab Results   Component Value Date    TROP 0.01 12/15/2018        Medications:    • Losartan Potassium  100 mg Oral Daily   • furosemide  20 mg Oral Daily   • gelatin absorbable  1 each Rectal

## 2018-12-21 NOTE — PROGRESS NOTES
[facility]    Progress Note    Ella Chavira Patient Status:  Inpatient    1950 MRN [id number]   Location [facility] 3W/SW Attending Cecil Wynn MD   Hosp Day # 6 PCP Devi Boykin        Subjective:     Respirator pneumoperitoneum. Findings may relate to post procedure sequela given metallic clips adjacent to the ileocecal valve. 2. Mild ascites, similar to previous study. 3. Cirrhosis. 4. Emphysema. 5. Trace left pleural effusion.   Dictated by (CST): Holly Yanes

## 2018-12-21 NOTE — PHYSICAL THERAPY NOTE
PHYSICAL THERAPY EVALUATION - INPATIENT     Room Number: 343/343-A  Evaluation Date: 12/21/2018  Type of Evaluation: Initial   Physician Order: PT Eval and Treat    Presenting Problem: SOB, LE swelling, low Hgb: cecal defect s/p clipping  Reason for CHYNA GOMEZ [facility] mobility; Endurance; Energy conservation;Patient education;Gait training;Strengthening;Stair training;Transfer training  Rehab Potential : Good  Frequency (Obs): (3-5X/wk; 1-2 more sessions)       PHYSICAL THERAPY MEDICAL/SOCIAL HISTORY       Problem List  P COGNITION  · Overall Cognitive Status:  WFL - within functional limits    RANGE OF MOTION AND STRENGTH ASSESSMENT  Upper extremity ROM and strength are within functional limits    Lower extremity ROM is within functional limits     Lower extremity stre without an alarm)    CURRENT GOALS    Goals to be met by: 12/24/18  Patient Goal Patient's self-stated goal is: to go home   Goal #1    Goal #1   Current Status    Goal #2 Patient is able to demonstrate transfers Sit to/from Stand at assistance level: inde

## 2018-12-21 NOTE — TRANSITION NOTE
Reason for consult: CHF     51-year-old female with underlying history of COPD, CHF who presents with chief complaint of increased dyspnea over the course last week. LE edema . Hemoglobin 5.3 on presentation         Assessment and Plan:   HFpEF  -ef 60% -

## 2018-12-22 VITALS
TEMPERATURE: 98 F | HEART RATE: 99 BPM | SYSTOLIC BLOOD PRESSURE: 115 MMHG | DIASTOLIC BLOOD PRESSURE: 65 MMHG | RESPIRATION RATE: 18 BRPM | OXYGEN SATURATION: 96 % | BODY MASS INDEX: 15.88 KG/M2 | WEIGHT: 86.31 LBS | HEIGHT: 62 IN

## 2018-12-22 PROCEDURE — 99255 IP/OBS CONSLTJ NEW/EST HI 80: CPT | Performed by: INTERNAL MEDICINE

## 2018-12-22 PROCEDURE — 99239 HOSP IP/OBS DSCHRG MGMT >30: CPT | Performed by: HOSPITALIST

## 2018-12-22 PROCEDURE — 99232 SBSQ HOSP IP/OBS MODERATE 35: CPT | Performed by: INTERNAL MEDICINE

## 2018-12-22 RX ORDER — LEVOFLOXACIN 500 MG/1
500 TABLET, FILM COATED ORAL DAILY
Qty: 14 TABLET | Refills: 0 | Status: SHIPPED | OUTPATIENT
Start: 2018-12-22 | End: 2019-01-05

## 2018-12-22 RX ORDER — POTASSIUM CHLORIDE 20 MEQ/1
40 TABLET, EXTENDED RELEASE ORAL EVERY 4 HOURS
Status: COMPLETED | OUTPATIENT
Start: 2018-12-22 | End: 2018-12-22

## 2018-12-22 RX ORDER — MAGNESIUM OXIDE 400 MG (241.3 MG MAGNESIUM) TABLET
800 TABLET ONCE
Status: COMPLETED | OUTPATIENT
Start: 2018-12-22 | End: 2018-12-22

## 2018-12-22 RX ORDER — MAGNESIUM SULFATE HEPTAHYDRATE 40 MG/ML
2 INJECTION, SOLUTION INTRAVENOUS ONCE
Status: COMPLETED | OUTPATIENT
Start: 2018-12-22 | End: 2018-12-22

## 2018-12-22 RX ORDER — METRONIDAZOLE 500 MG/1
500 TABLET ORAL 3 TIMES DAILY
Qty: 42 TABLET | Refills: 0 | Status: SHIPPED | OUTPATIENT
Start: 2018-12-22 | End: 2019-01-05

## 2018-12-22 RX ORDER — PANTOPRAZOLE SODIUM 40 MG/1
40 TABLET, DELAYED RELEASE ORAL DAILY
Qty: 30 TABLET | Refills: 0 | Status: SHIPPED | OUTPATIENT
Start: 2018-12-22

## 2018-12-22 RX ORDER — SODIUM CHLORIDE 9 MG/ML
INJECTION, SOLUTION INTRAVENOUS
Status: COMPLETED
Start: 2018-12-22 | End: 2018-12-22

## 2018-12-22 RX ORDER — FUROSEMIDE 10 MG/ML
20 INJECTION INTRAMUSCULAR; INTRAVENOUS ONCE
Status: COMPLETED | OUTPATIENT
Start: 2018-12-22 | End: 2018-12-22

## 2018-12-22 RX ORDER — FUROSEMIDE 20 MG/1
20 TABLET ORAL DAILY
Qty: 30 TABLET | Refills: 0 | Status: SHIPPED | OUTPATIENT
Start: 2018-12-23

## 2018-12-22 RX ORDER — LOSARTAN POTASSIUM 100 MG/1
100 TABLET ORAL DAILY
Qty: 30 TABLET | Refills: 0 | Status: SHIPPED | OUTPATIENT
Start: 2018-12-23

## 2018-12-22 NOTE — CONSULTS
[facility]    Report of Consultation    Date of Admission:  12/15/2018  Date of Consult:  12/22/2018   Reason for Consultation:     Hyponatremia    History of Present Illness:     Patient is a 69-year-old female with past medical history SURGICAL HISTORY      R hand tedon release       Family History  Family History   Problem Relation Age of Onset   • Cancer Father 79        colon   • Heart Disorder Mother    • Cancer Brother         lung       Social History  Patient Guardian Status:  Not fatigue, fevers and weight loss  Eyes: negative for irritation, redness and visual disturbance  Ears, nose, mouth, throat, and face: negative for hearing loss and sore throat  Respiratory: baseline sob- no worsening   Cardiovascular: negative for chest tiffanie normal  Psychiatric: calm    Results:     Laboratory Data:  Recent Labs   Lab  12/19/18   0504  12/20/18   0536  12/21/18   0517  12/22/18   0525   RBC  4.11  3.94   --   3.86   HGB  11.8*  11.3*  11.2*  11.1*   HCT  35.7  34.0*  34.2*  33.5*   MCV  86.8 consider discharge with follow up in clinic on 12/24 am.    Patient anxious to leave and upset with this setback - concern addressed       Thank you for allowing me to participate in the care of your patient.     Discussed with Nursing and Dr. Dennie George

## 2018-12-22 NOTE — PROGRESS NOTES
[facility]    Progress Note      Assessment and Plan:   1. Severe anemia with GI bleeding–scope has revealed internal hemorrhoids, cecal defect, gastropathy and hiatal hernia. The hemoglobin is now 11.1.     Recommendations: As per Israel Ruelas 12/22/2018    HCT 33.5 12/22/2018     12/22/2018    CREATSERUM 0.43 12/22/2018    BUN 2 12/22/2018     12/22/2018    K 3.6 12/22/2018    CL 94 12/22/2018    CO2 26 12/22/2018    GLU 96 12/22/2018    CA 8.5 12/22/2018    MG 1.5 12/22/2018    PH

## 2018-12-22 NOTE — DISCHARGE SUMMARY
[place] [facility]  Discharge Summary     Traci Cooper  : 1950    Status: Inpatient  Day #: 7    Attending: Rogerio Alfonso MD  PCP: Ashley Rodgers     Date of Admission: 12/15/2018  Date of Discharge: 2018  Disposition: Home with Likely secondary to above. - BNP elevated > 1000 though CXR clear and no prev h/o CHF per pt. Rpt .   - Cards following, will evaluate patient outpatient  - Baseline weight 95lbs.  Daily weights I/Os  - PO lasix, losartan on discharge and metoprolo temperature source Oral, resp. rate 16, height 5' 2\" (1.575 m), weight 86 lb 4.8 oz (39.1 kg), SpO2 98 %. General: No acute distress. Respiratory: Clear to auscultation bilaterally. No wheezes. No rhonchi.   Cardiovascular: S1, S2. Regular rate and rhyt information:  Yazmin Navarrete [address]  [phone]             Yasmani Alarcon MD.    Specialties:  SURGERY, GENERAL, Pediatric Surgery  Why:  As directed  Contact information:  [address] [facility]  [phone]

## 2018-12-22 NOTE — PROGRESS NOTES
[facility]     Gastroenterology Progress Note    Priscilla Beck Patient Status:  Inpatient    1950 MRN [id number]   Location [facility] 3W/SW Attending Camron Buckley MD   [facility] Day # 6 PCP [address] than her stated age  HEENT:Negative for scleral icterus.  Mucous membranes are moist.  Oxygen in place  CV- regular rate and rhythm   Lung- Clear to auscultation bilaterally  Abdomen-Non-distended.  Bowel sounds are present and quite active. Natanael Lout is abso 12/20/2018  CONCLUSION:  1. COPD/emphysema. No acute finding. 2. Old distal right clavicle fracture.      Dictated by (CST): Aline Avalos MD on 12/20/2018 at 13:51     Approved by (CST): Aline Avalos MD on 12/20/2018 at 13:57                  Benson Barillas

## 2018-12-23 ENCOUNTER — TELEPHONE (OUTPATIENT)
Dept: NEPHROLOGY | Facility: CLINIC | Age: 68
End: 2018-12-23

## 2018-12-27 ENCOUNTER — PATIENT MESSAGE (OUTPATIENT)
Dept: NEUROLOGY | Facility: CLINIC | Age: 68
End: 2018-12-27

## 2018-12-27 NOTE — TELEPHONE ENCOUNTER
From: Cristina Tatum  To: Tracy Valderrama MD  Sent: 12/27/2018 10:01 AM CST  Subject: Other    Due to a new insurance plan, I would like all medical history to be sent to  Colonel Nito MD  [address] [facility]  [facility] [id number]    [phone]  Fax

## 2018-12-28 NOTE — TELEPHONE ENCOUNTER
Geoffdinora ts claim is not processed, received 12/13/18, it is expected to make payment, & EOB is 30-45 days.

## 2019-02-05 NOTE — TELEPHONE ENCOUNTER
Per Murali the Claim # is [id number] for $162.00. She transferred RN to check claim status. [facility] claim was paid for $28.35 which is the amount that Medicaid allowed (contractual adjustment). Jerome Denis.

## 2019-03-29 ENCOUNTER — HOSPITAL ENCOUNTER (EMERGENCY)
Facility: HOSPITAL | Age: 69
Discharge: HOME | End: 2019-03-29
Attending: EMERGENCY MEDICINE
Payer: MEDICAID

## 2019-03-29 VITALS
BODY MASS INDEX: 14.72 KG/M2 | SYSTOLIC BLOOD PRESSURE: 112 MMHG | HEIGHT: 62 IN | OXYGEN SATURATION: 100 % | WEIGHT: 80 LBS | RESPIRATION RATE: 17 BRPM | HEART RATE: 95 BPM | TEMPERATURE: 98 F | DIASTOLIC BLOOD PRESSURE: 88 MMHG

## 2019-03-29 DIAGNOSIS — Z86.19 HISTORY OF CLOSTRIDIUM DIFFICILE COLITIS: ICD-10-CM

## 2019-03-29 DIAGNOSIS — R19.7 DIARRHEA OF PRESUMED INFECTIOUS ORIGIN: Primary | ICD-10-CM

## 2019-03-29 PROCEDURE — 36415 COLL VENOUS BLD VENIPUNCTURE: CPT

## 2019-03-29 PROCEDURE — 85025 COMPLETE CBC W/AUTO DIFF WBC: CPT

## 2019-03-29 PROCEDURE — 85025 COMPLETE CBC W/AUTO DIFF WBC: CPT | Performed by: EMERGENCY MEDICINE

## 2019-03-29 PROCEDURE — 99283 EMERGENCY DEPT VISIT LOW MDM: CPT

## 2019-03-29 PROCEDURE — 80048 BASIC METABOLIC PNL TOTAL CA: CPT | Performed by: EMERGENCY MEDICINE

## 2019-03-29 PROCEDURE — 80048 BASIC METABOLIC PNL TOTAL CA: CPT

## 2019-03-29 RX ORDER — METRONIDAZOLE 500 MG/1
500 TABLET ORAL 3 TIMES DAILY
Qty: 30 TABLET | Refills: 0 | Status: SHIPPED | OUTPATIENT
Start: 2019-03-29 | End: 2019-04-08

## 2019-03-29 NOTE — ED INITIAL ASSESSMENT (HPI)
Pt presents for eval of abdominal pain. Pt states \" I have had CDIFF 3 times and I think it's back. Starting Tuesday, I began having diarrhea, abdominal pain, and nausea. \" Pt denies having any fevers or  complaints.

## 2019-03-30 NOTE — ED PROVIDER NOTES
Patient Seen in: [facility] AND [facility] Emergency Department    History   Patient presents with:  Nausea/Vomiting/Diarrhea (gastrointestinal)      HPI    Patient presents to the ED complaining of crampy abdominal pain concerned about C. difficile.   She has perez Never Used      Tobacco comment: One cigarette daily x 2 months     Substance and Sexual Activity      Alcohol use:  Yes        Alcohol/week: 1.2 oz        Types: 2 Glasses of wine per week      Drug use: No    Other Topics      Concerns:        Caffeine Co CBC W/ DIFFERENTIAL - Abnormal; Notable for the following components:    MCHC 30.7 (*)     Lymphocyte Absolute 0.89 (*)     Monocyte Absolute 1.11 (*)     All other components within normal limits   CBC WITH DIFFERENTIAL WITH PLATELET    Narrative:     T lateral bulging discs; L5-S1 bilateral mild-mod foraminal stenosis; C3-4 mild-mod central, C4-5 right > left mod diffuse, C5-6 mild diffuse, C6-7 mod central bulging discs; C4-5 mod central & right mod foraminal stenosis;  Acute pain of right shoulder; Neur

## 2019-06-15 ENCOUNTER — HOSPITAL ENCOUNTER (EMERGENCY)
Facility: HOSPITAL | Age: 69
Discharge: HOME | End: 2019-06-15
Attending: EMERGENCY MEDICINE
Payer: MEDICAID

## 2019-06-15 ENCOUNTER — APPOINTMENT (OUTPATIENT)
Dept: CT IMAGING | Facility: HOSPITAL | Age: 69
End: 2019-06-15
Attending: EMERGENCY MEDICINE
Payer: MEDICAID

## 2019-06-15 VITALS
TEMPERATURE: 98 F | HEART RATE: 100 BPM | RESPIRATION RATE: 18 BRPM | WEIGHT: 80 LBS | OXYGEN SATURATION: 100 % | SYSTOLIC BLOOD PRESSURE: 128 MMHG | DIASTOLIC BLOOD PRESSURE: 61 MMHG | BODY MASS INDEX: 15 KG/M2

## 2019-06-15 DIAGNOSIS — A04.72 CLOSTRIDIUM DIFFICILE COLITIS: Primary | ICD-10-CM

## 2019-06-15 PROCEDURE — 96360 HYDRATION IV INFUSION INIT: CPT

## 2019-06-15 PROCEDURE — 80048 BASIC METABOLIC PNL TOTAL CA: CPT | Performed by: EMERGENCY MEDICINE

## 2019-06-15 PROCEDURE — 74177 CT ABD & PELVIS W/CONTRAST: CPT | Performed by: EMERGENCY MEDICINE

## 2019-06-15 PROCEDURE — 85025 COMPLETE CBC W/AUTO DIFF WBC: CPT | Performed by: EMERGENCY MEDICINE

## 2019-06-15 PROCEDURE — 87493 C DIFF AMPLIFIED PROBE: CPT | Performed by: EMERGENCY MEDICINE

## 2019-06-15 PROCEDURE — 99285 EMERGENCY DEPT VISIT HI MDM: CPT

## 2019-06-15 RX ORDER — DICYCLOMINE HCL 20 MG
20 TABLET ORAL 4 TIMES DAILY PRN
Qty: 20 TABLET | Refills: 0 | Status: SHIPPED | OUTPATIENT
Start: 2019-06-15 | End: 2019-06-20

## 2019-06-15 NOTE — ED INITIAL ASSESSMENT (HPI)
Hx of c-diff, finished PO abx yesterday. Today abd pain and diarrhea started back this morning.  Denies n/v

## 2019-06-15 NOTE — ED PROVIDER NOTES
Patient Seen in: [facility] AND [facility] Emergency Department    History   Patient presents with:  Abdomen/Flank Pain (GI/)    Stated Complaint: abd pain, c-diff    HPI    71year old female with h/o COPD on 3L O2 continuously, CHF, chronic back pain, HTN wh Constitutional and vital signs reviewed. All other systems reviewed and negative except as noted above.     Physical Exam     ED Triage Vitals [06/15/19 1254]   /85   Pulse 83   Resp 18   Temp 97.7 °F (36.5 °C)   Temp src Oral   SpO2 100 %   O2 D All other components within normal limits   C. DIFFICILE(TOXIGENIC)PCR - Abnormal; Notable for the following components:    C.  Difficile Toxin B Gene Positive (*)     All other components within normal limits   CBC W/ DIFFERENTIAL - Abnormal; Notable for CONCLUSION:  1. Extensive colonic pancolitis with marked bowel wall thickening , enhancement and pericolonic induration.   Differential would include inflammatory bowel disease, infectious etiologies versus antibiotic induced colitis in this patient with hi Schedule an appointment as soon as possible for a visit  Call for next available appointment    We recommend that you schedule follow up care with a primary care provider within the next three months to obtain basic health screening including reassessment

## 2019-06-29 NOTE — PROGRESS NOTES
[facility]    Progress Note      Assessment and Plan:   1. Severe anemia with GI bleeding–scope has revealed internal hemorrhoids, cecal defect, gastropathy and hiatal hernia. The hemoglobin is now 11.8.     Recommendations: As per Jose Gordon 4.0 12/20/2018    CL 94 12/20/2018    CO2 28 12/20/2018     12/20/2018    CA 8.1 12/20/2018    MG 1.6 12/20/2018     Chest x-ray–marked hyperinflation    CT scan the abdomen–1.  Significant free air is seen throughout the abdomen and pelvis, consiste Universal Safety Interventions

## 2019-12-10 RX ORDER — ALBUTEROL SULFATE 2.5 MG/3ML
SOLUTION RESPIRATORY (INHALATION)
Qty: 360 ML | Refills: 0 | Status: SHIPPED | OUTPATIENT
Start: 2019-12-10

## 2019-12-10 NOTE — TELEPHONE ENCOUNTER
Last seen 12-6-18   Last refill 5-14-18  Routed to [facility] for sign off.

## 2021-03-09 DIAGNOSIS — Z23 NEED FOR VACCINATION: ICD-10-CM

## 2024-09-10 NOTE — OPERATIVE REPORT
[facility]    PATIENT'S NAME: Eulalio Hodgkin   ATTENDING PHYSICIAN: Cornelia Preston MD   OPERATING PHYSICIAN: Loida Mares MD   PATIENT ACCOUNT#:   [id number]    LOCATION:  [address] [place] [address] bulb and sweep to the third portion whereupon biopsies x4 were taken of the third portion of the duodenum and submitted on Surgifoam to rule out enteropathy. The patient tolerated the procedure well without immediate complications. IMPRESSION:    1. Him/He

## (undated) DEVICE — NEEDLE SPINAL 22X3-1/2 BLK

## (undated) DEVICE — STANDARD HYPODERMIC NEEDLE,POLYPROPYLENE HUB: Brand: MONOJECT

## (undated) DEVICE — ENDOSCOPY PACK - LOWER: Brand: MEDLINE INDUSTRIES, INC.

## (undated) DEVICE — ENDOSCOPY PACK UPPER: Brand: MEDLINE INDUSTRIES, INC.

## (undated) DEVICE — CLIP LGT 11MM OPEN 2.8MM 235CM

## (undated) DEVICE — Device: Brand: DEFENDO AIR/WATER/SUCTION AND BIOPSY VALVE

## (undated) DEVICE — FORCEP RADIAL JAW 4

## (undated) DEVICE — CHLORAPREP ORANGE TINT 10.5ML

## (undated) DEVICE — SET XTN .1IN 2.7ML 20IN IV

## (undated) DEVICE — CONMED SCOPE SAVER BITE BLOCK, 20X27 MM: Brand: SCOPE SAVER

## (undated) DEVICE — LINE MNTR ADLT SET O2 INTMD

## (undated) DEVICE — GAMMEX® PI HYBRID SIZE 7, STERILE POWDER-FREE SURGICAL GLOVE, POLYISOPRENE AND NEOPRENE BLEND: Brand: GAMMEX

## (undated) DEVICE — 12 ML SYRINGE LUER-LOCK TIP: Brand: MONOJECT

## (undated) DEVICE — STERILE LATEX POWDER-FREE SURGICAL GLOVESWITH NITRILE COATING: Brand: PROTEXIS

## (undated) DEVICE — OMNIPAQUE 240ML VIAL

## (undated) DEVICE — TOWEL OR BLU 16X26 STRL

## (undated) NOTE — ED AVS SNAPSHOT
Hailee Alvarez   MRN: [id number]    Department:  [address] [place] [address] Emergency Department   Date of Visit:  3/29/2019           Disclosure     Insurance plans vary and the physician(s) referred by the ER may not be covered by your plan.  Please conta within the next three months to obtain basic health screening including reassessment of your blood pressure.     IF THERE IS ANY CHANGE OR WORSENING OF YOUR CONDITION, CALL YOUR PRIMARY CARE PHYSICIAN AT ONCE OR RETURN IMMEDIATELY TO THE EMERGENCY DEPARTMEN

## (undated) NOTE — LETTER
Maegan Singer Md  [address] [place], [place]       02/12/18        Patient: Anastasia Rai   YOB: 1950   Date of Visit: 2/12/2018       Dear Dwayne Pascal:           As you know, Pasquale Rowe is a 51-year-old female who I am now e accommodation. Neck without adenopathy, thyromegaly, JVD nor bruit. Lungs diminished with subtle central expiratory wheeze to auscultation and percussion. Cardiac regular rate and rhythm no murmur.  Abdomen nontender, without hepatosplenomegaly and no mass Document electronically generated by:  Kirill Zhang

## (undated) NOTE — ED AVS SNAPSHOT
Ella Chavira   MRN: [id number]    Department:  [facility] Emergency Department   Date of Visit:  6/15/2019           Disclosure     Insurance plans vary and the physician(s) referred by the ER may not be covered by your plan.  Please conta within the next three months to obtain basic health screening including reassessment of your blood pressure.     IF THERE IS ANY CHANGE OR WORSENING OF YOUR CONDITION, CALL YOUR PRIMARY CARE PHYSICIAN AT ONCE OR RETURN IMMEDIATELY TO THE EMERGENCY DEPARTMEN

## (undated) NOTE — LETTER
Hospital Discharge Documentation  Please phone to schedule a hospital follow up appointment.     From: [address] Hospitalist's Office  Phone: [phone]    Patient discharged time/date: 12/22/2018  8:08 PM  Patient discharge disposition:  Home or Blessing - IVF stopped  - low fiber diet tolerating well. - Oral abx with C.diff prophylaxis x 14 days. - outpt f/u with Dr. Jeanette Cisneros in 2-3 weeks.    - surgery following GI following.      Severe Anemia with HO + stool ( BARBY)  - s/p PRBC transfusion, hgb responded Katya Maharaj MD Consulting Physician  HOSPITALIST[AP.1]        Surgical Procedures     Case IDs Date Procedure Surgeon Location Status    [id number] 12/17/18 COLONOSCOPY Partha Pritchett MD [facility] ENDOSCOPY Comp        Lab Results   Component Valu Budesonide-Formoterol Fumarate (SYMBICORT) 160-4.5 MCG/ACT Inhalation Aerosol  INHALE 2 PUFFS INTO THE LUNGS TWICE DAILY    albuterol sulfate (2.5 MG/3ML) 0.083% Inhalation Nebu Soln  Take 3 mL (2.5 mg total) by nebulization every 6 (six) hours as needed f

## (undated) NOTE — LETTER
Surgery Scheduling Request Form/Physician Orders  Fax to:  [phone]          [x]  New Case []  Modified []  Rescheduled To:  / / Jigna Sutherland order supersedes any conflicting corresponding orders on the pre-op order set. Surgery Date: 11/16/18Start Pacemaker / AICD:  []  Yes          [x]  No     Day of Surgery Orders: Same as above           Date:  _      11/1/2018 __  Electronically Signed by: Dr. Leigh Ann Bell Time:  ___5:12 PM __    For Internal Use Only   Case #: Initials:   Date Booked: Time Booke